# Patient Record
Sex: MALE | Employment: OTHER | URBAN - METROPOLITAN AREA
[De-identification: names, ages, dates, MRNs, and addresses within clinical notes are randomized per-mention and may not be internally consistent; named-entity substitution may affect disease eponyms.]

---

## 2020-01-01 ENCOUNTER — APPOINTMENT (OUTPATIENT)
Dept: GENERAL RADIOLOGY | Age: 80
DRG: 870 | End: 2020-01-01
Attending: FAMILY MEDICINE
Payer: MEDICARE

## 2020-01-01 ENCOUNTER — TELEPHONE (OUTPATIENT)
Dept: PULMONOLOGY | Age: 80
End: 2020-01-01

## 2020-01-01 ENCOUNTER — APPOINTMENT (OUTPATIENT)
Dept: CT IMAGING | Age: 80
DRG: 870 | End: 2020-01-01
Attending: FAMILY MEDICINE
Payer: MEDICARE

## 2020-01-01 ENCOUNTER — HOSPITAL ENCOUNTER (INPATIENT)
Age: 80
LOS: 5 days | DRG: 870 | End: 2020-06-11
Attending: FAMILY MEDICINE | Admitting: FAMILY MEDICINE
Payer: MEDICARE

## 2020-01-01 VITALS
SYSTOLIC BLOOD PRESSURE: 134 MMHG | BODY MASS INDEX: 29.72 KG/M2 | WEIGHT: 224.21 LBS | OXYGEN SATURATION: 83 % | TEMPERATURE: 98.8 F | DIASTOLIC BLOOD PRESSURE: 72 MMHG | HEIGHT: 73 IN

## 2020-01-01 LAB
A/G RATIO: 0.6 (ref 1.1–2.2)
A/G RATIO: 0.7 (ref 1.1–2.2)
A/G RATIO: 0.7 (ref 1.1–2.2)
A/G RATIO: 0.8 (ref 1.1–2.2)
ALBUMIN FLUID: 0.8 G/DL
ALBUMIN SERPL-MCNC: 1.4 G/DL (ref 3.4–5)
ALBUMIN SERPL-MCNC: 2.2 G/DL (ref 3.4–5)
ALBUMIN SERPL-MCNC: 2.3 G/DL (ref 3.4–5)
ALBUMIN SERPL-MCNC: 2.3 G/DL (ref 3.4–5)
ALBUMIN SERPL-MCNC: 2.4 G/DL (ref 3.4–5)
ALBUMIN SERPL-MCNC: 2.5 G/DL (ref 3.4–5)
ALP BLD-CCNC: 109 U/L (ref 40–129)
ALP BLD-CCNC: 111 U/L (ref 40–129)
ALP BLD-CCNC: 112 U/L (ref 40–129)
ALP BLD-CCNC: 142 U/L (ref 40–129)
ALP BLD-CCNC: 155 U/L (ref 40–129)
ALP BLD-CCNC: 66 U/L (ref 40–129)
ALT SERPL-CCNC: 15 U/L (ref 10–40)
ALT SERPL-CCNC: 29 U/L (ref 10–40)
ALT SERPL-CCNC: 39 U/L (ref 10–40)
ALT SERPL-CCNC: 48 U/L (ref 10–40)
ALT SERPL-CCNC: 56 U/L (ref 10–40)
ALT SERPL-CCNC: 57 U/L (ref 10–40)
ANION GAP SERPL CALCULATED.3IONS-SCNC: 14 MMOL/L (ref 3–16)
ANION GAP SERPL CALCULATED.3IONS-SCNC: 15 MMOL/L (ref 3–16)
ANION GAP SERPL CALCULATED.3IONS-SCNC: 18 MMOL/L (ref 3–16)
ANION GAP SERPL CALCULATED.3IONS-SCNC: 19 MMOL/L (ref 3–16)
ANION GAP SERPL CALCULATED.3IONS-SCNC: 19 MMOL/L (ref 3–16)
ANION GAP SERPL CALCULATED.3IONS-SCNC: 21 MMOL/L (ref 3–16)
ANION GAP SERPL CALCULATED.3IONS-SCNC: 21 MMOL/L (ref 3–16)
ANION GAP SERPL CALCULATED.3IONS-SCNC: 22 MMOL/L (ref 3–16)
ANION GAP SERPL CALCULATED.3IONS-SCNC: 22 MMOL/L (ref 3–16)
ANION GAP SERPL CALCULATED.3IONS-SCNC: 23 MMOL/L (ref 3–16)
ANION GAP SERPL CALCULATED.3IONS-SCNC: 8 MMOL/L (ref 3–16)
APPEARANCE FLUID: NORMAL
APTT: 41.2 SEC (ref 24.2–36.2)
AST SERPL-CCNC: 44 U/L (ref 15–37)
AST SERPL-CCNC: 68 U/L (ref 15–37)
AST SERPL-CCNC: 75 U/L (ref 15–37)
AST SERPL-CCNC: 90 U/L (ref 15–37)
AST SERPL-CCNC: 91 U/L (ref 15–37)
AST SERPL-CCNC: 95 U/L (ref 15–37)
BASE EXCESS ARTERIAL: -4.8 MMOL/L (ref -3–3)
BASE EXCESS ARTERIAL: -7.3 MMOL/L (ref -3–3)
BASE EXCESS ARTERIAL: 0.4 MMOL/L (ref -3–3)
BASE EXCESS ARTERIAL: 2.9 MMOL/L (ref -3–3)
BASE EXCESS ARTERIAL: 3.8 MMOL/L (ref -3–3)
BASE EXCESS ARTERIAL: 7.5 MMOL/L (ref -3–3)
BASE EXCESS ARTERIAL: 7.9 MMOL/L (ref -3–3)
BASOPHILS ABSOLUTE: 0.1 K/UL (ref 0–0.2)
BASOPHILS RELATIVE PERCENT: 0.4 %
BETA-HYDROXYBUTYRATE: 0.13 MMOL/L (ref 0–0.27)
BILIRUB SERPL-MCNC: 0.5 MG/DL (ref 0–1)
BILIRUB SERPL-MCNC: 0.6 MG/DL (ref 0–1)
BILIRUB SERPL-MCNC: 0.6 MG/DL (ref 0–1)
BILIRUB SERPL-MCNC: 0.7 MG/DL (ref 0–1)
BILIRUB SERPL-MCNC: 0.8 MG/DL (ref 0–1)
BILIRUB SERPL-MCNC: 0.8 MG/DL (ref 0–1)
BILIRUBIN URINE: NEGATIVE
BLOOD, URINE: ABNORMAL
BUN BLDV-MCNC: 31 MG/DL (ref 7–20)
BUN BLDV-MCNC: 40 MG/DL (ref 7–20)
BUN BLDV-MCNC: 40 MG/DL (ref 7–20)
BUN BLDV-MCNC: 41 MG/DL (ref 7–20)
BUN BLDV-MCNC: 43 MG/DL (ref 7–20)
BUN BLDV-MCNC: 44 MG/DL (ref 7–20)
BUN BLDV-MCNC: 48 MG/DL (ref 7–20)
BUN BLDV-MCNC: 50 MG/DL (ref 7–20)
BUN BLDV-MCNC: 50 MG/DL (ref 7–20)
BUN BLDV-MCNC: 53 MG/DL (ref 7–20)
BUN BLDV-MCNC: 60 MG/DL (ref 7–20)
CALCIUM IONIZED: 0.89 MMOL/L (ref 1.12–1.32)
CALCIUM IONIZED: 0.95 MMOL/L (ref 1.12–1.32)
CALCIUM IONIZED: 1.03 MMOL/L (ref 1.12–1.32)
CALCIUM IONIZED: 1.04 MMOL/L (ref 1.12–1.32)
CALCIUM IONIZED: 1.08 MMOL/L (ref 1.12–1.32)
CALCIUM IONIZED: 1.08 MMOL/L (ref 1.12–1.32)
CALCIUM IONIZED: 1.09 MMOL/L (ref 1.12–1.32)
CALCIUM IONIZED: 1.09 MMOL/L (ref 1.12–1.32)
CALCIUM IONIZED: 1.1 MMOL/L (ref 1.12–1.32)
CALCIUM IONIZED: 1.11 MMOL/L (ref 1.12–1.32)
CALCIUM IONIZED: 1.15 MMOL/L (ref 1.12–1.32)
CALCIUM SERPL-MCNC: 5.6 MG/DL (ref 8.3–10.6)
CALCIUM SERPL-MCNC: 6.7 MG/DL (ref 8.3–10.6)
CALCIUM SERPL-MCNC: 7.3 MG/DL (ref 8.3–10.6)
CALCIUM SERPL-MCNC: 7.6 MG/DL (ref 8.3–10.6)
CALCIUM SERPL-MCNC: 8 MG/DL (ref 8.3–10.6)
CALCIUM SERPL-MCNC: 8 MG/DL (ref 8.3–10.6)
CALCIUM SERPL-MCNC: 8.2 MG/DL (ref 8.3–10.6)
CALCIUM SERPL-MCNC: 8.2 MG/DL (ref 8.3–10.6)
CALCIUM SERPL-MCNC: 8.3 MG/DL (ref 8.3–10.6)
CALCIUM SERPL-MCNC: 8.4 MG/DL (ref 8.3–10.6)
CALCIUM SERPL-MCNC: 8.4 MG/DL (ref 8.3–10.6)
CARBOXYHEMOGLOBIN ARTERIAL: 1.1 % (ref 0–1.5)
CARBOXYHEMOGLOBIN ARTERIAL: 1.4 % (ref 0–1.5)
CARBOXYHEMOGLOBIN ARTERIAL: 1.5 % (ref 0–1.5)
CARBOXYHEMOGLOBIN ARTERIAL: 1.6 % (ref 0–1.5)
CARBOXYHEMOGLOBIN ARTERIAL: 1.6 % (ref 0–1.5)
CARBOXYHEMOGLOBIN ARTERIAL: 1.7 % (ref 0–1.5)
CARBOXYHEMOGLOBIN ARTERIAL: 2.1 % (ref 0–1.5)
CELL COUNT FLUID TYPE: NORMAL
CELLULAR CASTS 2, UA: ABNORMAL /LPF
CELLULAR CASTS: ABNORMAL /LPF
CHLORIDE BLD-SCNC: 100 MMOL/L (ref 99–110)
CHLORIDE BLD-SCNC: 115 MMOL/L (ref 99–110)
CHLORIDE BLD-SCNC: 89 MMOL/L (ref 99–110)
CHLORIDE BLD-SCNC: 90 MMOL/L (ref 99–110)
CHLORIDE BLD-SCNC: 91 MMOL/L (ref 99–110)
CHLORIDE BLD-SCNC: 91 MMOL/L (ref 99–110)
CHLORIDE BLD-SCNC: 92 MMOL/L (ref 99–110)
CHLORIDE BLD-SCNC: 92 MMOL/L (ref 99–110)
CHLORIDE BLD-SCNC: 93 MMOL/L (ref 99–110)
CHLORIDE BLD-SCNC: 95 MMOL/L (ref 99–110)
CHLORIDE BLD-SCNC: 98 MMOL/L (ref 99–110)
CHOLESTEROL FLUID: 15 MG/DL
CLARITY: ABNORMAL
CLOT EVALUATION: NORMAL
CO2: 19 MMOL/L (ref 21–32)
CO2: 20 MMOL/L (ref 21–32)
CO2: 21 MMOL/L (ref 21–32)
CO2: 21 MMOL/L (ref 21–32)
CO2: 22 MMOL/L (ref 21–32)
CO2: 24 MMOL/L (ref 21–32)
CO2: 25 MMOL/L (ref 21–32)
CO2: 26 MMOL/L (ref 21–32)
CO2: 28 MMOL/L (ref 21–32)
COARSE CASTS, UA: ABNORMAL /LPF (ref 0–2)
COLOR FLUID: YELLOW
COLOR: YELLOW
COMMENT UA: ABNORMAL
CREAT SERPL-MCNC: 1.2 MG/DL (ref 0.8–1.3)
CREAT SERPL-MCNC: 1.6 MG/DL (ref 0.8–1.3)
CREAT SERPL-MCNC: 1.7 MG/DL (ref 0.8–1.3)
CREAT SERPL-MCNC: 1.8 MG/DL (ref 0.8–1.3)
CREAT SERPL-MCNC: 1.9 MG/DL (ref 0.8–1.3)
CREAT SERPL-MCNC: 2 MG/DL (ref 0.8–1.3)
CREAT SERPL-MCNC: 2.1 MG/DL (ref 0.8–1.3)
CREAT SERPL-MCNC: 2.1 MG/DL (ref 0.8–1.3)
CREAT SERPL-MCNC: 2.3 MG/DL (ref 0.8–1.3)
CULTURE, RESPIRATORY: NORMAL
EOSINOPHILS ABSOLUTE: 0 K/UL (ref 0–0.6)
EOSINOPHILS RELATIVE PERCENT: 0.1 %
EPITHELIAL CELLS, UA: 3 /HPF (ref 0–5)
FINE CASTS, UA: ABNORMAL /LPF (ref 0–2)
FLUID TYPE: NORMAL
GFR AFRICAN AMERICAN: 33
GFR AFRICAN AMERICAN: 37
GFR AFRICAN AMERICAN: 37
GFR AFRICAN AMERICAN: 39
GFR AFRICAN AMERICAN: 41
GFR AFRICAN AMERICAN: 44
GFR AFRICAN AMERICAN: 47
GFR AFRICAN AMERICAN: 50
GFR AFRICAN AMERICAN: >60
GFR NON-AFRICAN AMERICAN: 27
GFR NON-AFRICAN AMERICAN: 30
GFR NON-AFRICAN AMERICAN: 30
GFR NON-AFRICAN AMERICAN: 32
GFR NON-AFRICAN AMERICAN: 34
GFR NON-AFRICAN AMERICAN: 36
GFR NON-AFRICAN AMERICAN: 39
GFR NON-AFRICAN AMERICAN: 42
GFR NON-AFRICAN AMERICAN: 58
GLOBULIN: 2.3 G/DL
GLOBULIN: 3 G/DL
GLOBULIN: 3.2 G/DL
GLOBULIN: 3.5 G/DL
GLOBULIN: 3.6 G/DL
GLOBULIN: 3.7 G/DL
GLUCOSE BLD-MCNC: 100 MG/DL (ref 70–99)
GLUCOSE BLD-MCNC: 105 MG/DL (ref 70–99)
GLUCOSE BLD-MCNC: 112 MG/DL (ref 70–99)
GLUCOSE BLD-MCNC: 115 MG/DL (ref 70–99)
GLUCOSE BLD-MCNC: 123 MG/DL (ref 70–99)
GLUCOSE BLD-MCNC: 137 MG/DL (ref 70–99)
GLUCOSE BLD-MCNC: 141 MG/DL (ref 70–99)
GLUCOSE BLD-MCNC: 152 MG/DL (ref 70–99)
GLUCOSE BLD-MCNC: 157 MG/DL (ref 70–99)
GLUCOSE BLD-MCNC: 158 MG/DL (ref 70–99)
GLUCOSE BLD-MCNC: 162 MG/DL (ref 70–99)
GLUCOSE BLD-MCNC: 166 MG/DL (ref 70–99)
GLUCOSE BLD-MCNC: 167 MG/DL (ref 70–99)
GLUCOSE BLD-MCNC: 168 MG/DL (ref 70–99)
GLUCOSE BLD-MCNC: 170 MG/DL (ref 70–99)
GLUCOSE BLD-MCNC: 171 MG/DL (ref 70–99)
GLUCOSE BLD-MCNC: 172 MG/DL (ref 70–99)
GLUCOSE BLD-MCNC: 173 MG/DL (ref 70–99)
GLUCOSE BLD-MCNC: 177 MG/DL (ref 70–99)
GLUCOSE BLD-MCNC: 177 MG/DL (ref 70–99)
GLUCOSE BLD-MCNC: 178 MG/DL (ref 70–99)
GLUCOSE BLD-MCNC: 182 MG/DL (ref 70–99)
GLUCOSE BLD-MCNC: 188 MG/DL (ref 70–99)
GLUCOSE BLD-MCNC: 193 MG/DL (ref 70–99)
GLUCOSE BLD-MCNC: 193 MG/DL (ref 70–99)
GLUCOSE BLD-MCNC: 200 MG/DL (ref 70–99)
GLUCOSE BLD-MCNC: 200 MG/DL (ref 70–99)
GLUCOSE BLD-MCNC: 207 MG/DL (ref 70–99)
GLUCOSE BLD-MCNC: 212 MG/DL (ref 70–99)
GLUCOSE BLD-MCNC: 213 MG/DL (ref 70–99)
GLUCOSE BLD-MCNC: 215 MG/DL (ref 70–99)
GLUCOSE BLD-MCNC: 219 MG/DL (ref 70–99)
GLUCOSE BLD-MCNC: 243 MG/DL (ref 70–99)
GLUCOSE BLD-MCNC: 309 MG/DL (ref 70–99)
GLUCOSE BLD-MCNC: 359 MG/DL (ref 70–99)
GLUCOSE BLD-MCNC: 377 MG/DL (ref 70–99)
GLUCOSE BLD-MCNC: 392 MG/DL (ref 70–99)
GLUCOSE BLD-MCNC: 440 MG/DL (ref 70–99)
GLUCOSE BLD-MCNC: 480 MG/DL (ref 70–99)
GLUCOSE BLD-MCNC: 482 MG/DL (ref 70–99)
GLUCOSE BLD-MCNC: 81 MG/DL (ref 70–99)
GLUCOSE BLD-MCNC: 93 MG/DL (ref 70–99)
GLUCOSE BLD-MCNC: 95 MG/DL (ref 70–99)
GLUCOSE URINE: 500 MG/DL
GLUCOSE, FLUID: 359 MG/DL
GRAM STAIN RESULT: NORMAL
HCO3 ARTERIAL: 19 MMOL/L (ref 21–29)
HCO3 ARTERIAL: 22.8 MMOL/L (ref 21–29)
HCO3 ARTERIAL: 24.6 MMOL/L (ref 21–29)
HCO3 ARTERIAL: 26 MMOL/L (ref 21–29)
HCO3 ARTERIAL: 27.1 MMOL/L (ref 21–29)
HCO3 ARTERIAL: 30.9 MMOL/L (ref 21–29)
HCO3 ARTERIAL: 31.7 MMOL/L (ref 21–29)
HCT VFR BLD CALC: 23.3 % (ref 40.5–52.5)
HCT VFR BLD CALC: 23.9 % (ref 40.5–52.5)
HCT VFR BLD CALC: 25.1 % (ref 40.5–52.5)
HEMOGLOBIN, ART, EXTENDED: 6.4 G/DL (ref 13.5–17.5)
HEMOGLOBIN, ART, EXTENDED: 6.5 G/DL (ref 13.5–17.5)
HEMOGLOBIN, ART, EXTENDED: 6.9 G/DL (ref 13.5–17.5)
HEMOGLOBIN, ART, EXTENDED: 6.9 G/DL (ref 13.5–17.5)
HEMOGLOBIN, ART, EXTENDED: 7 G/DL (ref 13.5–17.5)
HEMOGLOBIN, ART, EXTENDED: 7.3 G/DL (ref 13.5–17.5)
HEMOGLOBIN, ART, EXTENDED: 7.9 G/DL (ref 13.5–17.5)
HEMOGLOBIN: 7.2 G/DL (ref 13.5–17.5)
HEMOGLOBIN: 7.7 G/DL (ref 13.5–17.5)
HEMOGLOBIN: 8 G/DL (ref 13.5–17.5)
INR BLD: 1.81 (ref 0.86–1.14)
INR BLD: 2.16 (ref 0.86–1.14)
KETONES, URINE: NEGATIVE MG/DL
LACTATE DEHYDROGENASE: 484 U/L (ref 100–190)
LACTIC ACID: 1.1 MMOL/L (ref 0.4–2)
LACTIC ACID: 1.7 MMOL/L (ref 0.4–2)
LACTIC ACID: 1.8 MMOL/L (ref 0.4–2)
LACTIC ACID: 2.7 MMOL/L (ref 0.4–2)
LACTIC ACID: 4.5 MMOL/L (ref 0.4–2)
LACTIC ACID: 6.4 MMOL/L (ref 0.4–2)
LACTIC ACID: 6.5 MMOL/L (ref 0.4–2)
LACTIC ACID: 7.9 MMOL/L (ref 0.4–2)
LACTIC ACID: 9.3 MMOL/L (ref 0.4–2)
LD, FLUID: 250 U/L
LEUKOCYTE ESTERASE, URINE: NEGATIVE
LV EF: 58 %
LVEF MODALITY: NORMAL
LYMPHOCYTES ABSOLUTE: 0.6 K/UL (ref 1–5.1)
LYMPHOCYTES RELATIVE PERCENT: 3.1 %
LYMPHOCYTES, BODY FLUID: 27 %
MACROPHAGE FLUID: 17 %
MAGNESIUM: 1.3 MG/DL (ref 1.8–2.4)
MAGNESIUM: 1.5 MG/DL (ref 1.8–2.4)
MAGNESIUM: 1.7 MG/DL (ref 1.8–2.4)
MAGNESIUM: 1.7 MG/DL (ref 1.8–2.4)
MAGNESIUM: 1.8 MG/DL (ref 1.8–2.4)
MAGNESIUM: 1.9 MG/DL (ref 1.8–2.4)
MAGNESIUM: 1.9 MG/DL (ref 1.8–2.4)
MAGNESIUM: 2 MG/DL (ref 1.8–2.4)
MAGNESIUM: 2.1 MG/DL (ref 1.8–2.4)
MCH RBC QN AUTO: 27.6 PG (ref 26–34)
MCH RBC QN AUTO: 27.7 PG (ref 26–34)
MCH RBC QN AUTO: 28.6 PG (ref 26–34)
MCHC RBC AUTO-ENTMCNC: 30.5 G/DL (ref 31–36)
MCHC RBC AUTO-ENTMCNC: 31 G/DL (ref 31–36)
MCHC RBC AUTO-ENTMCNC: 33.5 G/DL (ref 31–36)
MCV RBC AUTO: 85.4 FL (ref 80–100)
MCV RBC AUTO: 89.3 FL (ref 80–100)
MCV RBC AUTO: 90.3 FL (ref 80–100)
MESOTHELIAL FLUID: 11 %
METHEMOGLOBIN ARTERIAL: 0.3 %
METHEMOGLOBIN ARTERIAL: 0.4 %
METHEMOGLOBIN ARTERIAL: 0.5 %
METHEMOGLOBIN ARTERIAL: 0.7 %
METHEMOGLOBIN ARTERIAL: 0.8 %
MICROSCOPIC EXAMINATION: YES
MONOCYTE, FLUID: 7 %
MONOCYTES ABSOLUTE: 1.5 K/UL (ref 0–1.3)
MONOCYTES RELATIVE PERCENT: 8 %
MRSA CULTURE ONLY: NORMAL
NEUTROPHIL, FLUID: 29 %
NEUTROPHILS ABSOLUTE: 17 K/UL (ref 1.7–7.7)
NEUTROPHILS RELATIVE PERCENT: 88.4 %
NITRITE, URINE: NEGATIVE
NUCLEATED CELLS FLUID: 598 /CUMM
NUMBER OF CELLS COUNTED FLUID: 100
O2 CONTENT ARTERIAL: 10 ML/DL
O2 CONTENT ARTERIAL: 11 ML/DL
O2 CONTENT ARTERIAL: 9 ML/DL
O2 CONTENT ARTERIAL: 9 ML/DL
O2 SAT, ARTERIAL: 100 %
O2 SAT, ARTERIAL: 100 %
O2 SAT, ARTERIAL: 97.7 %
O2 SAT, ARTERIAL: 98.7 %
O2 SAT, ARTERIAL: 99.2 %
O2 SAT, ARTERIAL: 99.6 %
O2 SAT, ARTERIAL: 99.9 %
O2 THERAPY: ABNORMAL
PCO2 ARTERIAL: 32.3 MMHG (ref 35–45)
PCO2 ARTERIAL: 34.5 MMHG (ref 35–45)
PCO2 ARTERIAL: 36.4 MMHG (ref 35–45)
PCO2 ARTERIAL: 37.5 MMHG (ref 35–45)
PCO2 ARTERIAL: 40.3 MMHG (ref 35–45)
PCO2 ARTERIAL: 41.2 MMHG (ref 35–45)
PCO2 ARTERIAL: 56 MMHG (ref 35–45)
PDW BLD-RTO: 19.8 % (ref 12.4–15.4)
PDW BLD-RTO: 20.6 % (ref 12.4–15.4)
PDW BLD-RTO: 20.7 % (ref 12.4–15.4)
PERFORMED ON: ABNORMAL
PERFORMED ON: NORMAL
PH ARTERIAL: 7.22 (ref 7.35–7.45)
PH ARTERIAL: 7.27 (ref 7.35–7.45)
PH ARTERIAL: 7.44 (ref 7.35–7.45)
PH ARTERIAL: 7.5 (ref 7.35–7.45)
PH ARTERIAL: 7.5 (ref 7.35–7.45)
PH ARTERIAL: 7.51 (ref 7.35–7.45)
PH ARTERIAL: 7.52 (ref 7.35–7.45)
PH UA: 5.5 (ref 5–8)
PH VENOUS: 7.19 (ref 7.35–7.45)
PH VENOUS: 7.25 (ref 7.35–7.45)
PH VENOUS: 7.34 (ref 7.35–7.45)
PH VENOUS: 7.34 (ref 7.35–7.45)
PH VENOUS: 7.41 (ref 7.35–7.45)
PH VENOUS: 7.43 (ref 7.35–7.45)
PH VENOUS: 7.49 (ref 7.35–7.45)
PH VENOUS: 7.49 (ref 7.35–7.45)
PH VENOUS: 7.51 (ref 7.35–7.45)
PH VENOUS: 7.51 (ref 7.35–7.45)
PH VENOUS: 7.53 (ref 7.35–7.45)
PH, BODY FLUID: 7.8
PHOSPHORUS: 4.4 MG/DL (ref 2.5–4.9)
PHOSPHORUS: 4.9 MG/DL (ref 2.5–4.9)
PHOSPHORUS: 5.2 MG/DL (ref 2.5–4.9)
PHOSPHORUS: 5.3 MG/DL (ref 2.5–4.9)
PHOSPHORUS: 5.5 MG/DL (ref 2.5–4.9)
PHOSPHORUS: 6.1 MG/DL (ref 2.5–4.9)
PHOSPHORUS: 6.7 MG/DL (ref 2.5–4.9)
PHOSPHORUS: 6.8 MG/DL (ref 2.5–4.9)
PLATELET # BLD: 193 K/UL (ref 135–450)
PLATELET # BLD: 284 K/UL (ref 135–450)
PLATELET # BLD: 355 K/UL (ref 135–450)
PMV BLD AUTO: 7.5 FL (ref 5–10.5)
PMV BLD AUTO: 7.7 FL (ref 5–10.5)
PMV BLD AUTO: 7.8 FL (ref 5–10.5)
PO2 ARTERIAL: 103 MMHG (ref 75–108)
PO2 ARTERIAL: 123 MMHG (ref 75–108)
PO2 ARTERIAL: 147 MMHG (ref 75–108)
PO2 ARTERIAL: 153 MMHG (ref 75–108)
PO2 ARTERIAL: 165 MMHG (ref 75–108)
PO2 ARTERIAL: 92.3 MMHG (ref 75–108)
PO2 ARTERIAL: 97.6 MMHG (ref 75–108)
POC POTASSIUM: 3.7 MMOL/L (ref 3.5–5.1)
POC POTASSIUM: 3.8 MMOL/L (ref 3.5–5.1)
POC POTASSIUM: 3.9 MMOL/L (ref 3.5–5.1)
POC POTASSIUM: 3.9 MMOL/L (ref 3.5–5.1)
POC POTASSIUM: 4 MMOL/L (ref 3.5–5.1)
POC POTASSIUM: 4.1 MMOL/L (ref 3.5–5.1)
POC POTASSIUM: 4.1 MMOL/L (ref 3.5–5.1)
POC SAMPLE TYPE: ABNORMAL
POC SAMPLE TYPE: NORMAL
POTASSIUM REFLEX MAGNESIUM: 2.5 MMOL/L (ref 3.5–5.1)
POTASSIUM REFLEX MAGNESIUM: 3.7 MMOL/L (ref 3.5–5.1)
POTASSIUM REFLEX MAGNESIUM: 3.9 MMOL/L (ref 3.5–5.1)
POTASSIUM REFLEX MAGNESIUM: 3.9 MMOL/L (ref 3.5–5.1)
POTASSIUM REFLEX MAGNESIUM: 4.1 MMOL/L (ref 3.5–5.1)
POTASSIUM REFLEX MAGNESIUM: 4.5 MMOL/L (ref 3.5–5.1)
POTASSIUM SERPL-SCNC: 2.5 MMOL/L (ref 3.5–5.1)
POTASSIUM SERPL-SCNC: 3.1 MMOL/L (ref 3.5–5.1)
POTASSIUM SERPL-SCNC: 3.3 MMOL/L (ref 3.5–5.1)
POTASSIUM SERPL-SCNC: 3.7 MMOL/L (ref 3.5–5.1)
POTASSIUM SERPL-SCNC: 3.9 MMOL/L (ref 3.5–5.1)
POTASSIUM SERPL-SCNC: 3.9 MMOL/L (ref 3.5–5.1)
POTASSIUM SERPL-SCNC: 4.2 MMOL/L (ref 3.5–5.1)
POTASSIUM SERPL-SCNC: 4.4 MMOL/L (ref 3.5–5.1)
POTASSIUM SERPL-SCNC: 4.4 MMOL/L (ref 3.5–5.1)
POTASSIUM SERPL-SCNC: 4.5 MMOL/L (ref 3.5–5.1)
PROCALCITONIN: 0.52 NG/ML (ref 0–0.15)
PROCALCITONIN: 1.49 NG/ML (ref 0–0.15)
PROCALCITONIN: 3.39 NG/ML (ref 0–0.15)
PROCALCITONIN: 3.6 NG/ML (ref 0–0.15)
PROCALCITONIN: 4.27 NG/ML (ref 0–0.15)
PROCALCITONIN: 5.46 NG/ML (ref 0–0.15)
PROTEIN FLUID: 1.7 G/DL
PROTEIN UA: 30 MG/DL
PROTHROMBIN TIME: 21.1 SEC (ref 10–13.2)
PROTHROMBIN TIME: 25.2 SEC (ref 10–13.2)
RBC # BLD: 2.6 M/UL (ref 4.2–5.9)
RBC # BLD: 2.78 M/UL (ref 4.2–5.9)
RBC # BLD: 2.8 M/UL (ref 4.2–5.9)
RBC FLUID: 2771 /CUMM
RBC UA: 11 /HPF (ref 0–4)
SARS-COV-2: NOT DETECTED
SODIUM BLD-SCNC: 132 MMOL/L (ref 136–145)
SODIUM BLD-SCNC: 133 MMOL/L (ref 136–145)
SODIUM BLD-SCNC: 133 MMOL/L (ref 136–145)
SODIUM BLD-SCNC: 134 MMOL/L (ref 136–145)
SODIUM BLD-SCNC: 135 MMOL/L (ref 136–145)
SODIUM BLD-SCNC: 137 MMOL/L (ref 136–145)
SODIUM BLD-SCNC: 137 MMOL/L (ref 136–145)
SODIUM BLD-SCNC: 138 MMOL/L (ref 136–145)
SODIUM BLD-SCNC: 143 MMOL/L (ref 136–145)
SOURCE: NORMAL
SPECIFIC GRAVITY UA: 1.01 (ref 1–1.03)
TCO2 ARTERIAL: 20.2 MMOL/L
TCO2 ARTERIAL: 24.5 MMOL/L
TCO2 ARTERIAL: 25.7 MMOL/L
TCO2 ARTERIAL: 27 MMOL/L
TCO2 ARTERIAL: 28.2 MMOL/L
TCO2 ARTERIAL: 32 MMOL/L
TCO2 ARTERIAL: 33 MMOL/L
TOTAL PROTEIN: 3.7 G/DL (ref 6.4–8.2)
TOTAL PROTEIN: 5.3 G/DL (ref 6.4–8.2)
TOTAL PROTEIN: 5.5 G/DL (ref 6.4–8.2)
TOTAL PROTEIN: 5.7 G/DL (ref 6.4–8.2)
TOTAL PROTEIN: 6.1 G/DL (ref 6.4–8.2)
TOTAL PROTEIN: 6.1 G/DL (ref 6.4–8.2)
TROPONIN: 0.09 NG/ML
TROPONIN: 0.11 NG/ML
TROPONIN: 0.13 NG/ML
TROPONIN: 0.14 NG/ML
TROPONIN: 0.17 NG/ML
TROPONIN: 0.22 NG/ML
URINE TYPE: ABNORMAL
UROBILINOGEN, URINE: 0.2 E.U./DL
VANCOMYCIN RANDOM: 19.1 UG/ML
VANCOMYCIN RANDOM: 20.1 UG/ML
VANCOMYCIN RANDOM: 22.8 UG/ML
VANCOMYCIN RANDOM: 24.3 UG/ML
VARIANT LYMPH FLUID: 9 %
VOLUME: 40 ML
WBC # BLD: 15.7 K/UL (ref 4–11)
WBC # BLD: 19.2 K/UL (ref 4–11)
WBC # BLD: 8.7 K/UL (ref 4–11)
WBC UA: 93 /HPF (ref 0–5)

## 2020-01-01 PROCEDURE — 6360000002 HC RX W HCPCS: Performed by: FAMILY MEDICINE

## 2020-01-01 PROCEDURE — 83735 ASSAY OF MAGNESIUM: CPT

## 2020-01-01 PROCEDURE — B246ZZZ ULTRASONOGRAPHY OF RIGHT AND LEFT HEART: ICD-10-PCS | Performed by: INTERNAL MEDICINE

## 2020-01-01 PROCEDURE — 84145 PROCALCITONIN (PCT): CPT

## 2020-01-01 PROCEDURE — 94003 VENT MGMT INPAT SUBQ DAY: CPT

## 2020-01-01 PROCEDURE — 82330 ASSAY OF CALCIUM: CPT

## 2020-01-01 PROCEDURE — 2580000003 HC RX 258: Performed by: INTERNAL MEDICINE

## 2020-01-01 PROCEDURE — APPNB15 APP NON BILLABLE TIME 0-15 MINS: Performed by: NURSE PRACTITIONER

## 2020-01-01 PROCEDURE — 2500000003 HC RX 250 WO HCPCS: Performed by: FAMILY MEDICINE

## 2020-01-01 PROCEDURE — 6360000002 HC RX W HCPCS: Performed by: NURSE PRACTITIONER

## 2020-01-01 PROCEDURE — 2000000000 HC ICU R&B

## 2020-01-01 PROCEDURE — 2580000003 HC RX 258: Performed by: HOSPITALIST

## 2020-01-01 PROCEDURE — 71045 X-RAY EXAM CHEST 1 VIEW: CPT

## 2020-01-01 PROCEDURE — 99291 CRITICAL CARE FIRST HOUR: CPT | Performed by: INTERNAL MEDICINE

## 2020-01-01 PROCEDURE — 2700000000 HC OXYGEN THERAPY PER DAY

## 2020-01-01 PROCEDURE — 82947 ASSAY GLUCOSE BLOOD QUANT: CPT

## 2020-01-01 PROCEDURE — 94750 HC PULMONARY COMPLIANCE STUDY: CPT

## 2020-01-01 PROCEDURE — 37799 UNLISTED PX VASCULAR SURGERY: CPT

## 2020-01-01 PROCEDURE — 6370000000 HC RX 637 (ALT 250 FOR IP): Performed by: FAMILY MEDICINE

## 2020-01-01 PROCEDURE — 6370000000 HC RX 637 (ALT 250 FOR IP): Performed by: NURSE PRACTITIONER

## 2020-01-01 PROCEDURE — 36592 COLLECT BLOOD FROM PICC: CPT

## 2020-01-01 PROCEDURE — 84100 ASSAY OF PHOSPHORUS: CPT

## 2020-01-01 PROCEDURE — 84484 ASSAY OF TROPONIN QUANT: CPT

## 2020-01-01 PROCEDURE — 85027 COMPLETE CBC AUTOMATED: CPT

## 2020-01-01 PROCEDURE — 87205 SMEAR GRAM STAIN: CPT

## 2020-01-01 PROCEDURE — 88305 TISSUE EXAM BY PATHOLOGIST: CPT

## 2020-01-01 PROCEDURE — 82010 KETONE BODYS QUAN: CPT

## 2020-01-01 PROCEDURE — 82803 BLOOD GASES ANY COMBINATION: CPT

## 2020-01-01 PROCEDURE — 2580000003 HC RX 258: Performed by: FAMILY MEDICINE

## 2020-01-01 PROCEDURE — 84157 ASSAY OF PROTEIN OTHER: CPT

## 2020-01-01 PROCEDURE — 80202 ASSAY OF VANCOMYCIN: CPT

## 2020-01-01 PROCEDURE — 4A133BC MONITORING OF ARTERIAL PRESSURE, CORONARY, PERCUTANEOUS APPROACH: ICD-10-PCS | Performed by: INTERNAL MEDICINE

## 2020-01-01 PROCEDURE — 36620 INSERTION CATHETER ARTERY: CPT | Performed by: INTERNAL MEDICINE

## 2020-01-01 PROCEDURE — 70450 CT HEAD/BRAIN W/O DYE: CPT

## 2020-01-01 PROCEDURE — 80053 COMPREHEN METABOLIC PANEL: CPT

## 2020-01-01 PROCEDURE — 0W9B30Z DRAINAGE OF LEFT PLEURAL CAVITY WITH DRAINAGE DEVICE, PERCUTANEOUS APPROACH: ICD-10-PCS | Performed by: INTERNAL MEDICINE

## 2020-01-01 PROCEDURE — 85730 THROMBOPLASTIN TIME PARTIAL: CPT

## 2020-01-01 PROCEDURE — 84132 ASSAY OF SERUM POTASSIUM: CPT

## 2020-01-01 PROCEDURE — 82465 ASSAY BLD/SERUM CHOLESTEROL: CPT

## 2020-01-01 PROCEDURE — C9113 INJ PANTOPRAZOLE SODIUM, VIA: HCPCS | Performed by: INTERNAL MEDICINE

## 2020-01-01 PROCEDURE — 83605 ASSAY OF LACTIC ACID: CPT

## 2020-01-01 PROCEDURE — 6360000002 HC RX W HCPCS: Performed by: INTERNAL MEDICINE

## 2020-01-01 PROCEDURE — 6370000000 HC RX 637 (ALT 250 FOR IP): Performed by: INTERNAL MEDICINE

## 2020-01-01 PROCEDURE — 94761 N-INVAS EAR/PLS OXIMETRY MLT: CPT

## 2020-01-01 PROCEDURE — 87081 CULTURE SCREEN ONLY: CPT

## 2020-01-01 PROCEDURE — 36415 COLL VENOUS BLD VENIPUNCTURE: CPT

## 2020-01-01 PROCEDURE — 36600 WITHDRAWAL OF ARTERIAL BLOOD: CPT

## 2020-01-01 PROCEDURE — 83615 LACTATE (LD) (LDH) ENZYME: CPT

## 2020-01-01 PROCEDURE — 94002 VENT MGMT INPAT INIT DAY: CPT

## 2020-01-01 PROCEDURE — 85025 COMPLETE CBC W/AUTO DIFF WBC: CPT

## 2020-01-01 PROCEDURE — 5A1955Z RESPIRATORY VENTILATION, GREATER THAN 96 CONSECUTIVE HOURS: ICD-10-PCS | Performed by: FAMILY MEDICINE

## 2020-01-01 PROCEDURE — 85610 PROTHROMBIN TIME: CPT

## 2020-01-01 PROCEDURE — 74176 CT ABD & PELVIS W/O CONTRAST: CPT

## 2020-01-01 PROCEDURE — C8929 TTE W OR WO FOL WCON,DOPPLER: HCPCS

## 2020-01-01 PROCEDURE — 2500000003 HC RX 250 WO HCPCS: Performed by: INTERNAL MEDICINE

## 2020-01-01 PROCEDURE — U0003 INFECTIOUS AGENT DETECTION BY NUCLEIC ACID (DNA OR RNA); SEVERE ACUTE RESPIRATORY SYNDROME CORONAVIRUS 2 (SARS-COV-2) (CORONAVIRUS DISEASE [COVID-19]), AMPLIFIED PROBE TECHNIQUE, MAKING USE OF HIGH THROUGHPUT TECHNOLOGIES AS DESCRIBED BY CMS-2020-01-R: HCPCS

## 2020-01-01 PROCEDURE — 82042 OTHER SOURCE ALBUMIN QUAN EA: CPT

## 2020-01-01 PROCEDURE — 83986 ASSAY PH BODY FLUID NOS: CPT

## 2020-01-01 PROCEDURE — 6360000004 HC RX CONTRAST MEDICATION: Performed by: FAMILY MEDICINE

## 2020-01-01 PROCEDURE — 2580000003 HC RX 258: Performed by: NURSE PRACTITIONER

## 2020-01-01 PROCEDURE — 76937 US GUIDE VASCULAR ACCESS: CPT | Performed by: INTERNAL MEDICINE

## 2020-01-01 PROCEDURE — 80048 BASIC METABOLIC PNL TOTAL CA: CPT

## 2020-01-01 PROCEDURE — 94760 N-INVAS EAR/PLS OXIMETRY 1: CPT

## 2020-01-01 PROCEDURE — 32557 INSERT CATH PLEURA W/ IMAGE: CPT | Performed by: INTERNAL MEDICINE

## 2020-01-01 PROCEDURE — 99292 CRITICAL CARE ADDL 30 MIN: CPT | Performed by: INTERNAL MEDICINE

## 2020-01-01 PROCEDURE — 02HV33Z INSERTION OF INFUSION DEVICE INTO SUPERIOR VENA CAVA, PERCUTANEOUS APPROACH: ICD-10-PCS | Performed by: INTERNAL MEDICINE

## 2020-01-01 PROCEDURE — 36556 INSERT NON-TUNNEL CV CATH: CPT | Performed by: INTERNAL MEDICINE

## 2020-01-01 PROCEDURE — 82945 GLUCOSE OTHER FLUID: CPT

## 2020-01-01 PROCEDURE — 95819 EEG AWAKE AND ASLEEP: CPT

## 2020-01-01 PROCEDURE — 87070 CULTURE OTHR SPECIMN AEROBIC: CPT

## 2020-01-01 PROCEDURE — 89051 BODY FLUID CELL COUNT: CPT

## 2020-01-01 PROCEDURE — 88112 CYTOPATH CELL ENHANCE TECH: CPT

## 2020-01-01 PROCEDURE — 81001 URINALYSIS AUTO W/SCOPE: CPT

## 2020-01-01 RX ORDER — MAGNESIUM OXIDE 400 MG/1
400 TABLET ORAL DAILY
COMMUNITY

## 2020-01-01 RX ORDER — TROLAMINE SALICYLATE 10 G/100G
1 CREAM TOPICAL 2 TIMES DAILY
COMMUNITY

## 2020-01-01 RX ORDER — ERGOCALCIFEROL (VITAMIN D2) 1250 MCG
50000 CAPSULE ORAL WEEKLY
COMMUNITY

## 2020-01-01 RX ORDER — CALCIUM GLUCONATE 20 MG/ML
1 INJECTION, SOLUTION INTRAVENOUS ONCE
Status: COMPLETED | OUTPATIENT
Start: 2020-01-01 | End: 2020-01-01

## 2020-01-01 RX ORDER — MORPHINE SULFATE 4 MG/ML
4 INJECTION, SOLUTION INTRAMUSCULAR; INTRAVENOUS
Status: DISCONTINUED | OUTPATIENT
Start: 2020-01-01 | End: 2020-01-01 | Stop reason: HOSPADM

## 2020-01-01 RX ORDER — NICOTINE POLACRILEX 4 MG
15 LOZENGE BUCCAL PRN
Status: DISCONTINUED | OUTPATIENT
Start: 2020-01-01 | End: 2020-01-01 | Stop reason: HOSPADM

## 2020-01-01 RX ORDER — MAGNESIUM SULFATE 1 G/100ML
1 INJECTION INTRAVENOUS PRN
Status: DISCONTINUED | OUTPATIENT
Start: 2020-01-01 | End: 2020-01-01

## 2020-01-01 RX ORDER — PANTOPRAZOLE SODIUM 40 MG/1
40 TABLET, DELAYED RELEASE ORAL DAILY
COMMUNITY

## 2020-01-01 RX ORDER — INSULIN GLARGINE 100 [IU]/ML
20 INJECTION, SOLUTION SUBCUTANEOUS NIGHTLY
COMMUNITY

## 2020-01-01 RX ORDER — CEFTRIAXONE 2 G/1
2 INJECTION, POWDER, FOR SOLUTION INTRAMUSCULAR; INTRAVENOUS EVERY 24 HOURS
COMMUNITY
Start: 2020-01-01 | End: 2020-06-30

## 2020-01-01 RX ORDER — CHLORHEXIDINE GLUCONATE 0.12 MG/ML
15 RINSE ORAL 2 TIMES DAILY
Status: DISCONTINUED | OUTPATIENT
Start: 2020-01-01 | End: 2020-01-01 | Stop reason: HOSPADM

## 2020-01-01 RX ORDER — PANTOPRAZOLE SODIUM 40 MG/10ML
40 INJECTION, POWDER, LYOPHILIZED, FOR SOLUTION INTRAVENOUS DAILY
Status: DISCONTINUED | OUTPATIENT
Start: 2020-01-01 | End: 2020-01-01 | Stop reason: HOSPADM

## 2020-01-01 RX ORDER — LORAZEPAM 2 MG/ML
2 INJECTION INTRAMUSCULAR
Status: DISCONTINUED | OUTPATIENT
Start: 2020-01-01 | End: 2020-01-01 | Stop reason: HOSPADM

## 2020-01-01 RX ORDER — METOPROLOL TARTRATE 50 MG/1
50 TABLET, FILM COATED ORAL EVERY MORNING
COMMUNITY

## 2020-01-01 RX ORDER — FLUTICASONE FUROATE, UMECLIDINIUM BROMIDE AND VILANTEROL TRIFENATATE 100; 62.5; 25 UG/1; UG/1; UG/1
1 POWDER RESPIRATORY (INHALATION) DAILY
COMMUNITY

## 2020-01-01 RX ORDER — SODIUM CHLORIDE, SODIUM LACTATE, POTASSIUM CHLORIDE, CALCIUM CHLORIDE 600; 310; 30; 20 MG/100ML; MG/100ML; MG/100ML; MG/100ML
INJECTION, SOLUTION INTRAVENOUS CONTINUOUS
Status: DISCONTINUED | OUTPATIENT
Start: 2020-01-01 | End: 2020-01-01 | Stop reason: HOSPADM

## 2020-01-01 RX ORDER — SODIUM CHLORIDE 0.9 % (FLUSH) 0.9 %
10 SYRINGE (ML) INJECTION PRN
Status: DISCONTINUED | OUTPATIENT
Start: 2020-01-01 | End: 2020-01-01 | Stop reason: HOSPADM

## 2020-01-01 RX ORDER — MORPHINE SULFATE 4 MG/ML
4 INJECTION, SOLUTION INTRAMUSCULAR; INTRAVENOUS ONCE
Status: COMPLETED | OUTPATIENT
Start: 2020-01-01 | End: 2020-01-01

## 2020-01-01 RX ORDER — 0.9 % SODIUM CHLORIDE 0.9 %
2000 INTRAVENOUS SOLUTION INTRAVENOUS ONCE
Status: COMPLETED | OUTPATIENT
Start: 2020-01-01 | End: 2020-01-01

## 2020-01-01 RX ORDER — MAGNESIUM SULFATE IN WATER 40 MG/ML
2 INJECTION, SOLUTION INTRAVENOUS ONCE
Status: DISCONTINUED | OUTPATIENT
Start: 2020-01-01 | End: 2020-01-01

## 2020-01-01 RX ORDER — POTASSIUM CHLORIDE 29.8 MG/ML
20 INJECTION INTRAVENOUS
Status: COMPLETED | OUTPATIENT
Start: 2020-01-01 | End: 2020-01-01

## 2020-01-01 RX ORDER — MORPHINE SULFATE 2 MG/ML
2 INJECTION, SOLUTION INTRAMUSCULAR; INTRAVENOUS
Status: DISCONTINUED | OUTPATIENT
Start: 2020-01-01 | End: 2020-01-01 | Stop reason: HOSPADM

## 2020-01-01 RX ORDER — DEXTROSE MONOHYDRATE 25 G/50ML
12.5 INJECTION, SOLUTION INTRAVENOUS PRN
Status: DISCONTINUED | OUTPATIENT
Start: 2020-01-01 | End: 2020-01-01 | Stop reason: HOSPADM

## 2020-01-01 RX ORDER — AMLODIPINE BESYLATE 5 MG/1
5 TABLET ORAL DAILY
COMMUNITY

## 2020-01-01 RX ORDER — ONDANSETRON 2 MG/ML
4 INJECTION INTRAMUSCULAR; INTRAVENOUS EVERY 6 HOURS PRN
Status: DISCONTINUED | OUTPATIENT
Start: 2020-01-01 | End: 2020-01-01 | Stop reason: HOSPADM

## 2020-01-01 RX ORDER — DONEPEZIL HYDROCHLORIDE 10 MG/1
10 TABLET, FILM COATED ORAL NIGHTLY
COMMUNITY

## 2020-01-01 RX ORDER — ACETAMINOPHEN 650 MG/1
650 SUPPOSITORY RECTAL EVERY 6 HOURS PRN
Status: DISCONTINUED | OUTPATIENT
Start: 2020-01-01 | End: 2020-01-01 | Stop reason: HOSPADM

## 2020-01-01 RX ORDER — LEVETIRACETAM 5 MG/ML
500 INJECTION INTRAVASCULAR EVERY 12 HOURS
Status: DISCONTINUED | OUTPATIENT
Start: 2020-01-01 | End: 2020-01-01 | Stop reason: HOSPADM

## 2020-01-01 RX ORDER — ACETAMINOPHEN 325 MG/1
650 TABLET ORAL EVERY 6 HOURS PRN
COMMUNITY

## 2020-01-01 RX ORDER — PYRIDOXINE HCL (VITAMIN B6) 100 MG
100 TABLET ORAL DAILY
COMMUNITY

## 2020-01-01 RX ORDER — POTASSIUM CHLORIDE 29.8 MG/ML
40 INJECTION INTRAVENOUS ONCE
Status: DISCONTINUED | OUTPATIENT
Start: 2020-01-01 | End: 2020-01-01

## 2020-01-01 RX ORDER — IPRATROPIUM BROMIDE 21 UG/1
2 SPRAY, METERED NASAL DAILY
COMMUNITY

## 2020-01-01 RX ORDER — SODIUM CHLORIDE 0.9 % (FLUSH) 0.9 %
10 SYRINGE (ML) INJECTION EVERY 12 HOURS SCHEDULED
Status: DISCONTINUED | OUTPATIENT
Start: 2020-01-01 | End: 2020-01-01 | Stop reason: HOSPADM

## 2020-01-01 RX ORDER — HEPARIN SODIUM 5000 [USP'U]/ML
5000 INJECTION, SOLUTION INTRAVENOUS; SUBCUTANEOUS EVERY 8 HOURS SCHEDULED
Status: DISCONTINUED | OUTPATIENT
Start: 2020-01-01 | End: 2020-01-01 | Stop reason: HOSPADM

## 2020-01-01 RX ORDER — ATORVASTATIN CALCIUM 80 MG/1
80 TABLET, FILM COATED ORAL DAILY
COMMUNITY

## 2020-01-01 RX ORDER — PROMETHAZINE HYDROCHLORIDE 25 MG/1
12.5 TABLET ORAL EVERY 6 HOURS PRN
Status: DISCONTINUED | OUTPATIENT
Start: 2020-01-01 | End: 2020-01-01 | Stop reason: HOSPADM

## 2020-01-01 RX ORDER — PROMETHAZINE HYDROCHLORIDE 25 MG/1
12.5 TABLET ORAL EVERY 6 HOURS PRN
Status: DISCONTINUED | OUTPATIENT
Start: 2020-01-01 | End: 2020-01-01

## 2020-01-01 RX ORDER — DEXTROSE MONOHYDRATE 50 MG/ML
100 INJECTION, SOLUTION INTRAVENOUS PRN
Status: DISCONTINUED | OUTPATIENT
Start: 2020-01-01 | End: 2020-01-01 | Stop reason: HOSPADM

## 2020-01-01 RX ORDER — ASPIRIN 81 MG/1
81 TABLET ORAL DAILY
COMMUNITY

## 2020-01-01 RX ORDER — ACETAMINOPHEN 325 MG/1
650 TABLET ORAL EVERY 6 HOURS PRN
Status: DISCONTINUED | OUTPATIENT
Start: 2020-01-01 | End: 2020-01-01 | Stop reason: HOSPADM

## 2020-01-01 RX ORDER — MAGNESIUM SULFATE 1 G/100ML
1 INJECTION INTRAVENOUS ONCE
Status: COMPLETED | OUTPATIENT
Start: 2020-01-01 | End: 2020-01-01

## 2020-01-01 RX ORDER — LORAZEPAM 2 MG/ML
4 INJECTION INTRAMUSCULAR ONCE
Status: COMPLETED | OUTPATIENT
Start: 2020-01-01 | End: 2020-01-01

## 2020-01-01 RX ORDER — FUROSEMIDE 40 MG/1
40 TABLET ORAL 2 TIMES DAILY
COMMUNITY

## 2020-01-01 RX ORDER — ONDANSETRON 2 MG/ML
4 INJECTION INTRAMUSCULAR; INTRAVENOUS EVERY 6 HOURS PRN
Status: DISCONTINUED | OUTPATIENT
Start: 2020-01-01 | End: 2020-01-01 | Stop reason: SDUPTHER

## 2020-01-01 RX ORDER — POLYETHYLENE GLYCOL 3350 17 G/17G
17 POWDER, FOR SOLUTION ORAL DAILY PRN
Status: DISCONTINUED | OUTPATIENT
Start: 2020-01-01 | End: 2020-01-01 | Stop reason: HOSPADM

## 2020-01-01 RX ADMIN — CHLORHEXIDINE GLUCONATE 15 ML: 1.2 RINSE ORAL at 08:35

## 2020-01-01 RX ADMIN — MUPIROCIN: 20 OINTMENT TOPICAL at 21:41

## 2020-01-01 RX ADMIN — INSULIN LISPRO 3 UNITS: 100 INJECTION, SOLUTION INTRAVENOUS; SUBCUTANEOUS at 15:31

## 2020-01-01 RX ADMIN — INSULIN LISPRO 3 UNITS: 100 INJECTION, SOLUTION INTRAVENOUS; SUBCUTANEOUS at 08:39

## 2020-01-01 RX ADMIN — HEPARIN SODIUM 5000 UNITS: 5000 INJECTION INTRAVENOUS; SUBCUTANEOUS at 04:48

## 2020-01-01 RX ADMIN — MORPHINE SULFATE 4 MG: 4 INJECTION, SOLUTION INTRAMUSCULAR; INTRAVENOUS at 09:00

## 2020-01-01 RX ADMIN — LEVETIRACETAM 500 MG: 5 INJECTION INTRAVENOUS at 09:37

## 2020-01-01 RX ADMIN — SODIUM CHLORIDE, POTASSIUM CHLORIDE, SODIUM LACTATE AND CALCIUM CHLORIDE: 600; 310; 30; 20 INJECTION, SOLUTION INTRAVENOUS at 14:09

## 2020-01-01 RX ADMIN — CHLORHEXIDINE GLUCONATE 15 ML: 1.2 RINSE ORAL at 08:00

## 2020-01-01 RX ADMIN — SODIUM CHLORIDE, PRESERVATIVE FREE 10 ML: 5 INJECTION INTRAVENOUS at 08:35

## 2020-01-01 RX ADMIN — CALCIUM GLUCONATE 1 G: 20 INJECTION, SOLUTION INTRAVENOUS at 14:00

## 2020-01-01 RX ADMIN — INSULIN LISPRO 6 UNITS: 100 INJECTION, SOLUTION INTRAVENOUS; SUBCUTANEOUS at 14:04

## 2020-01-01 RX ADMIN — SODIUM CHLORIDE, POTASSIUM CHLORIDE, SODIUM LACTATE AND CALCIUM CHLORIDE: 600; 310; 30; 20 INJECTION, SOLUTION INTRAVENOUS at 05:22

## 2020-01-01 RX ADMIN — SODIUM CHLORIDE, PRESERVATIVE FREE 10 ML: 5 INJECTION INTRAVENOUS at 07:59

## 2020-01-01 RX ADMIN — CALCIUM GLUCONATE 2 G: 98 INJECTION, SOLUTION INTRAVENOUS at 08:42

## 2020-01-01 RX ADMIN — CHLORHEXIDINE GLUCONATE 15 ML: 1.2 RINSE ORAL at 21:15

## 2020-01-01 RX ADMIN — INSULIN LISPRO 2 UNITS: 100 INJECTION, SOLUTION INTRAVENOUS; SUBCUTANEOUS at 14:31

## 2020-01-01 RX ADMIN — MUPIROCIN: 20 OINTMENT TOPICAL at 21:00

## 2020-01-01 RX ADMIN — MEROPENEM 500 MG: 500 INJECTION, POWDER, FOR SOLUTION INTRAVENOUS at 18:41

## 2020-01-01 RX ADMIN — INSULIN LISPRO 3 UNITS: 100 INJECTION, SOLUTION INTRAVENOUS; SUBCUTANEOUS at 21:30

## 2020-01-01 RX ADMIN — VANCOMYCIN HYDROCHLORIDE 1500 MG: 10 INJECTION, POWDER, LYOPHILIZED, FOR SOLUTION INTRAVENOUS at 20:58

## 2020-01-01 RX ADMIN — HEPARIN SODIUM 5000 UNITS: 5000 INJECTION INTRAVENOUS; SUBCUTANEOUS at 05:46

## 2020-01-01 RX ADMIN — HEPARIN SODIUM 5000 UNITS: 5000 INJECTION INTRAVENOUS; SUBCUTANEOUS at 15:29

## 2020-01-01 RX ADMIN — SODIUM CHLORIDE, POTASSIUM CHLORIDE, SODIUM LACTATE AND CALCIUM CHLORIDE: 600; 310; 30; 20 INJECTION, SOLUTION INTRAVENOUS at 01:59

## 2020-01-01 RX ADMIN — SODIUM CHLORIDE, PRESERVATIVE FREE 10 ML: 5 INJECTION INTRAVENOUS at 21:00

## 2020-01-01 RX ADMIN — SODIUM CHLORIDE, PRESERVATIVE FREE 10 ML: 5 INJECTION INTRAVENOUS at 22:19

## 2020-01-01 RX ADMIN — INSULIN LISPRO 2 UNITS: 100 INJECTION, SOLUTION INTRAVENOUS; SUBCUTANEOUS at 10:03

## 2020-01-01 RX ADMIN — MEROPENEM 500 MG: 500 INJECTION, POWDER, FOR SOLUTION INTRAVENOUS at 13:10

## 2020-01-01 RX ADMIN — MUPIROCIN: 20 OINTMENT TOPICAL at 22:19

## 2020-01-01 RX ADMIN — INSULIN LISPRO 4 UNITS: 100 INJECTION, SOLUTION INTRAVENOUS; SUBCUTANEOUS at 04:48

## 2020-01-01 RX ADMIN — CALCIUM GLUCONATE 2 G: 98 INJECTION, SOLUTION INTRAVENOUS at 10:05

## 2020-01-01 RX ADMIN — PANTOPRAZOLE SODIUM 40 MG: 40 INJECTION, POWDER, FOR SOLUTION INTRAVENOUS at 09:03

## 2020-01-01 RX ADMIN — LORAZEPAM 4 MG: 2 INJECTION INTRAMUSCULAR; INTRAVENOUS at 09:00

## 2020-01-01 RX ADMIN — SODIUM CHLORIDE 10.98 UNITS/HR: 9 INJECTION, SOLUTION INTRAVENOUS at 09:47

## 2020-01-01 RX ADMIN — MEROPENEM 500 MG: 500 INJECTION, POWDER, FOR SOLUTION INTRAVENOUS at 00:18

## 2020-01-01 RX ADMIN — ALTEPLASE 1 MG: 2.2 INJECTION, POWDER, LYOPHILIZED, FOR SOLUTION INTRAVENOUS at 11:30

## 2020-01-01 RX ADMIN — PIPERACILLIN AND TAZOBACTAM 3.38 G: 3; .375 INJECTION, POWDER, LYOPHILIZED, FOR SOLUTION INTRAVENOUS at 19:14

## 2020-01-01 RX ADMIN — CHLORHEXIDINE GLUCONATE 15 ML: 1.2 RINSE ORAL at 22:17

## 2020-01-01 RX ADMIN — SODIUM CHLORIDE, POTASSIUM CHLORIDE, SODIUM LACTATE AND CALCIUM CHLORIDE: 600; 310; 30; 20 INJECTION, SOLUTION INTRAVENOUS at 00:31

## 2020-01-01 RX ADMIN — INSULIN LISPRO 6 UNITS: 100 INJECTION, SOLUTION INTRAVENOUS; SUBCUTANEOUS at 08:15

## 2020-01-01 RX ADMIN — LEVETIRACETAM 500 MG: 5 INJECTION INTRAVENOUS at 08:35

## 2020-01-01 RX ADMIN — LEVETIRACETAM 500 MG: 5 INJECTION INTRAVENOUS at 10:22

## 2020-01-01 RX ADMIN — INSULIN LISPRO 4 UNITS: 100 INJECTION, SOLUTION INTRAVENOUS; SUBCUTANEOUS at 01:22

## 2020-01-01 RX ADMIN — INSULIN LISPRO 3 UNITS: 100 INJECTION, SOLUTION INTRAVENOUS; SUBCUTANEOUS at 05:45

## 2020-01-01 RX ADMIN — MUPIROCIN: 20 OINTMENT TOPICAL at 08:03

## 2020-01-01 RX ADMIN — CHLORHEXIDINE GLUCONATE 15 ML: 1.2 RINSE ORAL at 08:05

## 2020-01-01 RX ADMIN — SODIUM CHLORIDE, POTASSIUM CHLORIDE, SODIUM LACTATE AND CALCIUM CHLORIDE: 600; 310; 30; 20 INJECTION, SOLUTION INTRAVENOUS at 11:15

## 2020-01-01 RX ADMIN — MUPIROCIN: 20 OINTMENT TOPICAL at 08:58

## 2020-01-01 RX ADMIN — INSULIN LISPRO 3 UNITS: 100 INJECTION, SOLUTION INTRAVENOUS; SUBCUTANEOUS at 00:44

## 2020-01-01 RX ADMIN — INSULIN LISPRO 3 UNITS: 100 INJECTION, SOLUTION INTRAVENOUS; SUBCUTANEOUS at 00:39

## 2020-01-01 RX ADMIN — CHLORHEXIDINE GLUCONATE 15 ML: 1.2 RINSE ORAL at 09:01

## 2020-01-01 RX ADMIN — INSULIN LISPRO 2 UNITS: 100 INJECTION, SOLUTION INTRAVENOUS; SUBCUTANEOUS at 18:30

## 2020-01-01 RX ADMIN — SODIUM CHLORIDE, PRESERVATIVE FREE 10 ML: 5 INJECTION INTRAVENOUS at 19:53

## 2020-01-01 RX ADMIN — MEROPENEM 500 MG: 500 INJECTION, POWDER, FOR SOLUTION INTRAVENOUS at 00:44

## 2020-01-01 RX ADMIN — INSULIN LISPRO 6 UNITS: 100 INJECTION, SOLUTION INTRAVENOUS; SUBCUTANEOUS at 16:17

## 2020-01-01 RX ADMIN — MEROPENEM 500 MG: 500 INJECTION, POWDER, FOR SOLUTION INTRAVENOUS at 11:26

## 2020-01-01 RX ADMIN — HEPARIN SODIUM 5000 UNITS: 5000 INJECTION INTRAVENOUS; SUBCUTANEOUS at 21:31

## 2020-01-01 RX ADMIN — MEROPENEM 500 MG: 500 INJECTION, POWDER, FOR SOLUTION INTRAVENOUS at 07:59

## 2020-01-01 RX ADMIN — SODIUM CHLORIDE, PRESERVATIVE FREE 10 ML: 5 INJECTION INTRAVENOUS at 21:15

## 2020-01-01 RX ADMIN — INSULIN LISPRO 3 UNITS: 100 INJECTION, SOLUTION INTRAVENOUS; SUBCUTANEOUS at 11:43

## 2020-01-01 RX ADMIN — CALCIUM GLUCONATE 1 G: 20 INJECTION, SOLUTION INTRAVENOUS at 03:10

## 2020-01-01 RX ADMIN — HEPARIN SODIUM 5000 UNITS: 5000 INJECTION INTRAVENOUS; SUBCUTANEOUS at 22:17

## 2020-01-01 RX ADMIN — LEVETIRACETAM 500 MG: 5 INJECTION INTRAVENOUS at 22:17

## 2020-01-01 RX ADMIN — PANTOPRAZOLE SODIUM 40 MG: 40 INJECTION, POWDER, FOR SOLUTION INTRAVENOUS at 08:36

## 2020-01-01 RX ADMIN — INSULIN LISPRO 2 UNITS: 100 INJECTION, SOLUTION INTRAVENOUS; SUBCUTANEOUS at 20:30

## 2020-01-01 RX ADMIN — Medication 20 MEQ: at 14:52

## 2020-01-01 RX ADMIN — HEPARIN SODIUM 5000 UNITS: 5000 INJECTION INTRAVENOUS; SUBCUTANEOUS at 14:31

## 2020-01-01 RX ADMIN — MUPIROCIN: 20 OINTMENT TOPICAL at 19:54

## 2020-01-01 RX ADMIN — MEROPENEM 500 MG: 500 INJECTION, POWDER, FOR SOLUTION INTRAVENOUS at 23:41

## 2020-01-01 RX ADMIN — SODIUM BICARBONATE: 84 INJECTION, SOLUTION INTRAVENOUS at 01:09

## 2020-01-01 RX ADMIN — HEPARIN SODIUM 5000 UNITS: 5000 INJECTION INTRAVENOUS; SUBCUTANEOUS at 05:52

## 2020-01-01 RX ADMIN — PANTOPRAZOLE SODIUM 40 MG: 40 INJECTION, POWDER, FOR SOLUTION INTRAVENOUS at 07:59

## 2020-01-01 RX ADMIN — CHLORHEXIDINE GLUCONATE 15 ML: 1.2 RINSE ORAL at 21:00

## 2020-01-01 RX ADMIN — MAGNESIUM SULFATE HEPTAHYDRATE 1 G: 1 INJECTION, SOLUTION INTRAVENOUS at 05:30

## 2020-01-01 RX ADMIN — INSULIN LISPRO 3 UNITS: 100 INJECTION, SOLUTION INTRAVENOUS; SUBCUTANEOUS at 05:52

## 2020-01-01 RX ADMIN — SODIUM CHLORIDE 2000 ML: 9 INJECTION, SOLUTION INTRAVENOUS at 01:10

## 2020-01-01 RX ADMIN — LEVETIRACETAM 500 MG: 5 INJECTION INTRAVENOUS at 20:31

## 2020-01-01 RX ADMIN — MEROPENEM 500 MG: 500 INJECTION, POWDER, FOR SOLUTION INTRAVENOUS at 11:59

## 2020-01-01 RX ADMIN — INSULIN LISPRO 6 UNITS: 100 INJECTION, SOLUTION INTRAVENOUS; SUBCUTANEOUS at 22:17

## 2020-01-01 RX ADMIN — MAGNESIUM SULFATE HEPTAHYDRATE 1 G: 1 INJECTION, SOLUTION INTRAVENOUS at 13:59

## 2020-01-01 RX ADMIN — Medication 20 MEQ: at 14:00

## 2020-01-01 RX ADMIN — Medication 2 MCG/MIN: at 10:25

## 2020-01-01 RX ADMIN — Medication 30 MCG/MIN: at 18:00

## 2020-01-01 RX ADMIN — CHLORHEXIDINE GLUCONATE 15 ML: 1.2 RINSE ORAL at 19:53

## 2020-01-01 RX ADMIN — MEROPENEM 500 MG: 500 INJECTION, POWDER, FOR SOLUTION INTRAVENOUS at 01:29

## 2020-01-01 RX ADMIN — LEVETIRACETAM 500 MG: 5 INJECTION INTRAVENOUS at 21:31

## 2020-01-01 RX ADMIN — SODIUM CHLORIDE, POTASSIUM CHLORIDE, SODIUM LACTATE AND CALCIUM CHLORIDE: 600; 310; 30; 20 INJECTION, SOLUTION INTRAVENOUS at 15:42

## 2020-01-01 RX ADMIN — MUPIROCIN: 20 OINTMENT TOPICAL at 08:35

## 2020-01-01 RX ADMIN — HEPARIN SODIUM 5000 UNITS: 5000 INJECTION INTRAVENOUS; SUBCUTANEOUS at 14:04

## 2020-01-01 RX ADMIN — HEPARIN SODIUM 5000 UNITS: 5000 INJECTION INTRAVENOUS; SUBCUTANEOUS at 20:31

## 2020-01-01 RX ADMIN — SODIUM CHLORIDE, PRESERVATIVE FREE 10 ML: 5 INJECTION INTRAVENOUS at 09:03

## 2020-01-01 RX ADMIN — Medication 20 MCG/MIN: at 01:11

## 2020-01-01 RX ADMIN — MEROPENEM 500 MG: 500 INJECTION, POWDER, FOR SOLUTION INTRAVENOUS at 16:25

## 2020-01-01 RX ADMIN — MEROPENEM 500 MG: 500 INJECTION, POWDER, FOR SOLUTION INTRAVENOUS at 02:56

## 2020-01-01 RX ADMIN — PANTOPRAZOLE SODIUM 40 MG: 40 INJECTION, POWDER, FOR SOLUTION INTRAVENOUS at 08:03

## 2020-01-01 RX ADMIN — PERFLUTREN 2 ML: 6.52 INJECTION, SUSPENSION INTRAVENOUS at 14:53

## 2020-01-01 RX ADMIN — SODIUM CHLORIDE, PRESERVATIVE FREE 10 ML: 5 INJECTION INTRAVENOUS at 08:03

## 2020-01-01 RX ADMIN — SODIUM CHLORIDE 11.4 UNITS/HR: 9 INJECTION, SOLUTION INTRAVENOUS at 04:30

## 2020-01-01 RX ADMIN — MAGNESIUM SULFATE HEPTAHYDRATE 1 G: 1 INJECTION, SOLUTION INTRAVENOUS at 07:07

## 2020-01-01 RX ADMIN — CALCIUM GLUCONATE 1 G: 98 INJECTION, SOLUTION INTRAVENOUS at 11:27

## 2020-01-01 RX ADMIN — SODIUM BICARBONATE: 84 INJECTION, SOLUTION INTRAVENOUS at 23:46

## 2020-01-01 RX ADMIN — MUPIROCIN: 20 OINTMENT TOPICAL at 08:00

## 2020-01-01 ASSESSMENT — PULMONARY FUNCTION TESTS
PIF_VALUE: 26
PIF_VALUE: 26
PIF_VALUE: 35
PIF_VALUE: 30
PIF_VALUE: 29
PIF_VALUE: 25
PIF_VALUE: 29
PIF_VALUE: 21
PIF_VALUE: 31
PIF_VALUE: 22
PIF_VALUE: 29
PIF_VALUE: 31
PIF_VALUE: 34
PIF_VALUE: 29
PIF_VALUE: 32
PIF_VALUE: 26
PIF_VALUE: 30
PIF_VALUE: 25
PIF_VALUE: 24
PIF_VALUE: 18
PIF_VALUE: 25
PIF_VALUE: 37
PIF_VALUE: 27
PIF_VALUE: 27
PIF_VALUE: 28
PIF_VALUE: 26
PIF_VALUE: 15
PIF_VALUE: 26
PIF_VALUE: 20
PIF_VALUE: 23
PIF_VALUE: 28
PIF_VALUE: 28
PIF_VALUE: 24
PIF_VALUE: 26
PIF_VALUE: 30
PIF_VALUE: 23
PIF_VALUE: 17
PIF_VALUE: 22
PIF_VALUE: 9
PIF_VALUE: 17
PIF_VALUE: 29
PIF_VALUE: 20
PIF_VALUE: 13
PIF_VALUE: 25
PIF_VALUE: 25
PIF_VALUE: 28
PIF_VALUE: 29
PIF_VALUE: 15
PIF_VALUE: 16
PIF_VALUE: 19
PIF_VALUE: 30
PIF_VALUE: 29
PIF_VALUE: 26
PIF_VALUE: 20
PIF_VALUE: 23
PIF_VALUE: 24
PIF_VALUE: 19
PIF_VALUE: 27
PIF_VALUE: 26
PIF_VALUE: 29
PIF_VALUE: 24
PIF_VALUE: 21
PIF_VALUE: 8
PIF_VALUE: 28
PIF_VALUE: 24
PIF_VALUE: 27
PIF_VALUE: 26
PIF_VALUE: 25
PIF_VALUE: 26
PIF_VALUE: 28
PIF_VALUE: 8
PIF_VALUE: 26
PIF_VALUE: 12
PIF_VALUE: 24
PIF_VALUE: 23
PIF_VALUE: 20
PIF_VALUE: 27
PIF_VALUE: 27
PIF_VALUE: 25
PIF_VALUE: 23
PIF_VALUE: 12
PIF_VALUE: 25
PIF_VALUE: 14
PIF_VALUE: 26
PIF_VALUE: 28
PIF_VALUE: 33
PIF_VALUE: 24
PIF_VALUE: 23
PIF_VALUE: 24
PIF_VALUE: 26
PIF_VALUE: 16
PIF_VALUE: 26
PIF_VALUE: 17
PIF_VALUE: 32
PIF_VALUE: 18
PIF_VALUE: 26
PIF_VALUE: 28
PIF_VALUE: 18
PIF_VALUE: 33
PIF_VALUE: 24
PIF_VALUE: 25
PIF_VALUE: 36
PIF_VALUE: 28
PIF_VALUE: 25
PIF_VALUE: 26
PIF_VALUE: 9
PIF_VALUE: 30
PIF_VALUE: 32
PIF_VALUE: 14
PIF_VALUE: 23
PIF_VALUE: 31
PIF_VALUE: 19
PIF_VALUE: 30
PIF_VALUE: 23
PIF_VALUE: 25
PIF_VALUE: 23
PIF_VALUE: 20
PIF_VALUE: 20
PIF_VALUE: 18
PIF_VALUE: 16
PIF_VALUE: 27
PIF_VALUE: 20
PIF_VALUE: 22
PIF_VALUE: 20
PIF_VALUE: 28
PIF_VALUE: 29
PIF_VALUE: 22
PIF_VALUE: 24
PIF_VALUE: 27
PIF_VALUE: 29
PIF_VALUE: 26
PIF_VALUE: 25
PIF_VALUE: 28
PIF_VALUE: 26
PIF_VALUE: 16
PIF_VALUE: 26
PIF_VALUE: 16
PIF_VALUE: 14
PIF_VALUE: 22
PIF_VALUE: 26
PIF_VALUE: 22
PIF_VALUE: 26
PIF_VALUE: 25
PIF_VALUE: 28
PIF_VALUE: 26
PIF_VALUE: 32
PIF_VALUE: 31
PIF_VALUE: 23
PIF_VALUE: 25
PIF_VALUE: 26
PIF_VALUE: 30
PIF_VALUE: 24
PIF_VALUE: 25
PIF_VALUE: 25
PIF_VALUE: 10
PIF_VALUE: 26
PIF_VALUE: 28
PIF_VALUE: 24
PIF_VALUE: 11
PIF_VALUE: 21
PIF_VALUE: 33
PIF_VALUE: 26
PIF_VALUE: 26
PIF_VALUE: 9
PIF_VALUE: 8
PIF_VALUE: 20
PIF_VALUE: 28
PIF_VALUE: 25
PIF_VALUE: 28
PIF_VALUE: 24
PIF_VALUE: 17
PIF_VALUE: 23
PIF_VALUE: 27
PIF_VALUE: 25
PIF_VALUE: 24
PIF_VALUE: 31
PIF_VALUE: 29
PIF_VALUE: 27
PIF_VALUE: 24
PIF_VALUE: 24
PIF_VALUE: 15
PIF_VALUE: 24
PIF_VALUE: 15
PIF_VALUE: 27
PIF_VALUE: 29
PIF_VALUE: 32
PIF_VALUE: 26
PIF_VALUE: 17
PIF_VALUE: 28
PIF_VALUE: 13
PIF_VALUE: 22
PIF_VALUE: 29
PIF_VALUE: 25
PIF_VALUE: 21
PIF_VALUE: 29
PIF_VALUE: 26
PIF_VALUE: 28
PIF_VALUE: 20
PIF_VALUE: 24
PIF_VALUE: 27
PIF_VALUE: 28
PIF_VALUE: 30
PIF_VALUE: 24
PIF_VALUE: 26
PIF_VALUE: 8

## 2020-01-01 ASSESSMENT — PAIN SCALES - GENERAL
PAINLEVEL_OUTOF10: 0

## 2020-06-06 PROBLEM — I46.9 CARDIAC ARREST (HCC): Status: ACTIVE | Noted: 2020-01-01

## 2020-06-06 NOTE — H&P
Patient has no allergy information on record. Social History:  The patient currently lives at [address] [place] [address] St:   has no history on file for tobacco.  ETOH:   has no history on file for alcohol. Family History:  Reviewed in detail and negative for DM, Early CAD, Cancer, CVA. Positive as follows:    No family history on file. REVIEW OF SYSTEMS: Unable to obtain    PHYSICAL EXAM:    Pulse 56   Resp 20   SpO2 100%     General appearance: Sedated intubated  HEENT Normal cephalic, atraumatic without obvious deformity. Pinpoint pupils but equal, round, and reactive to light. Extra ocular muscles intact. Conjunctivae/corneas clear. Neck: Supple, No jugular venous distention/bruits. Trachea midline without thyromegaly or adenopathy with full range of motion. Lungs: Ventilated clear to auscultation, bilaterally without Rales/Wheezes/Rhonchi with good respiratory effort. Heart: Regular rate and rhythm with Normal S1/S2 without murmurs, rubs or gallops, point of maximum impulse non-displaced  Abdomen: Soft, non-tender or non-distended without rigidity or guarding and positive bowel sounds all four quadrants. Extremities: No clubbing, cyanosis, or edema bilaterally. Skin: Skin color, texture, turgor normal.  No rashes or lesions. Neurologic: Sedated intubated, withdraws to painful stimuli. Has some spontaneous movement although movement is myoclonic jerking in nature.   Mental status: Sedated intubated  Capillary Refill: Acceptable  < 3 seconds  Peripheral Pulses: +3 Easily felt, not easily obliterated with pressure      CBC   Recent Labs     06/06/20 1838   WBC 19.2*   HGB 7.7*   HCT 25.1*         RENAL  Recent Labs     06/06/20 1909   *   K 4.5  4.5   CL 91*   CO2 19*   PHOS 6.7*   BUN 41*   CREATININE 1.8*     LFT'S  Recent Labs     06/06/20 1909   AST 91*   ALT 56*   BILITOT 0.6   ALKPHOS 155*     COAG  Recent Labs     06/06/20 1838   INR 2.16*     CARDIAC ENZYMES  Recent Labs     06/06/20 1909   TROPONINI 0.22*       U/A:  No results found for: NITRITE, COLORU, WBCUA, RBCUA, MUCUS, BACTERIA, CLARITYU, SPECGRAV, LEUKOCYTESUR, BLOODU, GLUCOSEU, AMORPHOUS    ABG    Lab Results   Component Value Date    GCQ6QDU 19.0 06/06/2020    BEART -7.3 06/06/2020    I1GYAGXG 99.2 06/06/2020    PHART 7.272 06/06/2020    QAV7SSU 41.2 06/06/2020    PO2ART 123.0 06/06/2020    CZD3DQC 20.2 06/06/2020           Active Hospital Problems    Diagnosis Date Noted    Cardiac arrest [facility]) [I46.9] 06/06/2020         PHYSICIANS CERTIFICATION:    I certify that Kota Keys is expected to be hospitalized for more than 2 midnights based on the following assessment and plan:      ASSESSMENT/PLAN:    Cardiac arrest:  -Cause uncertain. Troponin and procalcitonin on arrival at [facility] ED were within normal limits. WBC was elevated at 14 in potassium elevated at 5.9.  -Empiric antibiotic treatment for now with Vanco and Zosyn   -Blood cultures obtained at outside hospital.  Follow-up results  -COVID rule out test pending  -Hypothermia protocol initiated. Given myoclonic jerking patient has poor neurologic status prognosis    Potential hyperkalemia: Potassium 5.9 at outside hospital.  Will repeat lab and treat if needed    Chronic pleural catheter:  -Uncertain the cause. May be due to CHF or some type of malignancy. -CT chest pending    Chronic medical history uncertain: We will need to obtain records    DVT Prophylaxis: Lovenox  Diet: Diet NPO Effective Now  Code Status: Full Code  PT/OT Eval Status: Sedated intubated    Dispo -ICU status       Samantha Peralta MD    Thank you No primary care provider on file. for the opportunity to be involved in this patient's care. If you have any questions or concerns please feel free to contact me at [phone].

## 2020-06-07 NOTE — PROGRESS NOTES
RN called about pt desat 86%. fio2 changed to 70%. sats back up to 95%. Will continue to monitor.     Electronically signed by Binta Wu RCP on 6/7/2020 at 3:43 AM

## 2020-06-07 NOTE — CONSULTS
Clinical Pharmacy Note  Vancomycin Consult    Lexi Saul is a 78 y.o. male ordered Vancomycin for cardiac arrest/emperic therapy; consult received from Dr. Varghese Peace to manage therapy. Also receiving zosyn 3.375 gms every 8 hours. Patient Active Problem List   Diagnosis    Cardiac arrest [facility])       Allergies:  Patient has no allergy information on record. Temp max:  Temp (24hrs), Av.8 °F (33.8 °C), Min:92.5 °F (33.6 °C), Max:93.1 °F (33.9 °C)      Recent Labs     20  1838   WBC 19.2*       Recent Labs     20  1909   BUN 41*   CREATININE 1.8*       No intake or output data in the 24 hours ending 20 2152    Culture Results:      Ht Readings from Last 1 Encounters:   20 6' 1\" (1.854 m)        Wt Readings from Last 1 Encounters:   20 211 lb 6.7 oz (95.9 kg)         Estimated Creatinine Clearance: 39 mL/min (A) (based on SCr of 1.8 mg/dL (H)). Assessment/Plan:  Vancomycin 1500 mg IVPB times one dose. Goal trough level of 15-20 mg/L. Level ordered for 0620. Thank you for the consult. Will continue to follow.     48 [facility], [facility], [place] 2020  9:54 PM

## 2020-06-07 NOTE — PROGRESS NOTES
0730 Handoff completed. Patient in maintenance phase of therapeutic hypothermia. Temp at goal. Other VSS on ventilator, insulin drip, sodium bicarb. No sedation, no pressors at this time. Patient has non-purposeful myoclonic movements. Pupils 1-2 nonreactive. Minimal gag/cough. No response to pain or voice. L chest tube draining serosanguinous drainage. Dr. Leisa Carrillo notified of critical lactic acid. 0800 Dr. Leisa Carrillo at bedside, vent settings adjusted. Continue hypothermia protocol. 0900 [facility] referral made, not candidate for organ donation. 0930 Dr. Heriberto Veras at bedside, no new orders. Will attempt to get medical records from past hospitals, as no records available from [facility]. 1100 Spoke to daughter, Smitha Alfred,     36 Dr. Marita Rae at bedside, renal function improving, continue serial labs. [address] reviewed with Dr. Marita Rae, replacements ordered. Blood sugars stabilized, insulin dc'd per Dr. Heriberto Veras. 1800 Continues through maintenance phase of hypothermia. Temp well controlled, other VSS, assessment grossly unchanged.      Electronically signed by Francisco Amezcua RN on 6/7/2020 at 6:28 PM

## 2020-06-07 NOTE — ACP (ADVANCE CARE PLANNING)
changes to patient's previously recorded wishes  [] New Advance Directive completed  [] Portable Do Not Rescitate prepared for Provider review and signature  [] POLST/POST/MOLST/MOST prepared for Provider review and signature      Follow-up plan:    [] Schedule follow-up conversation to continue planning  [] Referred individual to Provider for additional questions/concerns   [] Advised patient/agent/surrogate to review completed ACP document and update if needed with changes in condition, patient preferences or care setting    [] This note routed to one or more involved healthcare providers - unable to route to his PCP      Electronically signed by Lelo Malone RN on 6/7/2020 at 11:25 AM

## 2020-06-07 NOTE — PROGRESS NOTES
1930: Report received from Pasquale Arnold [facility]  2005: Pt to CT  2035: Pt returned from CT  2105: Call made to pt's wife for updates on pt and to obtain consent for procedures. 2230: Dr. Higinio Berry at bedside to place June, central line, and chest tube. 0030: Hypothermia protocol initiated. 0109: 2L NS bolus and Sodium Bicarb infusion started per order. 0244: Message sent to Dr. Toy Ruiz in request for magnesium replacements. 0400: New orders received for insulin gtt and magnesium replacement  0439: Levo gtt stopped. 5092:  Insulin gtt started  0700: Report given to Gelacio Hopper RN  Electronically signed by Jes Betts RN on 6/7/2020 at 7:30AM

## 2020-06-07 NOTE — PROGRESS NOTES
Office : [phone]     Fax :[phone]         Renal Progress Note  Subjective:   Admit Date: 6/6/2020     HPI     78 yr old gentleman transferred from [facility] yesterday . He had cardiac arrest and was intubated. Was transferred to [address] [place] [address],[address].   Has h/o history of valvular disease, CHF, diabetes, carotid endarterectomy, oral cancer          Interval History:     He is on hypothermia protocol   BP is maintained   UOP is low. Not on any sedation at this time.    BS high   Lactic acid high   Off levophed now     DIET Diet NPO Effective Now  Medications:   Scheduled Meds:   sodium chloride flush  10 mL Intravenous 2 times per day    chlorhexidine  15 mL Mouth/Throat BID    vancomycin (VANCOCIN) intermittent dosing (placeholder)   Other RX Placeholder    meropenem  500 mg Intravenous Q8H    mupirocin   Topical BID    pantoprazole  40 mg Intravenous Daily     Continuous Infusions:   insulin 13.28 Units/hr (06/07/20 0642)    dextrose      norepinephrine Stopped (06/07/20 0439)    sodium bicarbonate in sterile water infusion 100 mL/hr at 06/07/20 0109       Labs:  CBC:   Recent Labs     06/06/20  1838 06/07/20  0630   WBC 19.2* 15.7*   HGB 7.7* 7.2*    284     BMP:    Recent Labs     06/06/20  1909 06/07/20  0115 06/07/20  0630   * 134* 132*   K 4.5  4.5 4.4 3.9  3.9   CL 91* 93* 92*   CO2 19* 19* 19*   BUN 41* 44* 43*   CREATININE 1.8* 1.8* 2.0*   GLUCOSE 309* 480* 377*     Ca/Mg/Phos:   Recent Labs     06/06/20  1909 06/07/20  0115 06/07/20  0630   CALCIUM 8.4 8.0* 7.6*   MG 1.70* 1.50* 1.90   PHOS 6.7* 6.1* 6.8*     Hepatic:

## 2020-06-07 NOTE — PROCEDURES
Central Venous Catheter Insertion Procedure Note    Consent: wife    Informed consent was obtained for the procedure, including sedation. Risks of  hemorrhage, arrhythmia, infection and adverse drug reaction were discussed    Indication:  Shock, Limited Access     Estimated Blood Loss: Minimal    Procedure:    Ultrasound used and leftfemoral vein was noted to be patent. Patient positioned then prepared with sterile technique including gown, mask, drape and gloves. Lidocaine was administered via 25 gauge needle. 18 gauge needle inserted with access of leftfemoral vein under active ultrasound guidance. Wire inserted to 20 cm  . Small incision made in skin, dilator inserted over wire then removed. Triple lumen inserted to 20 cm, secured and sterile dressing applied. Ultrasound Report:  Patent vein and artery with wire noted in both after completed. Complications:  None  There were no changes to vital signs. Patient did tolerate procedure well. Total time of procedure 25 minutes. [id number]  [id number]      Arterial Catheter Insertion Procedure Note  Consent: Unable to be obtained due to the emergent nature of this procedure. Procedure: Insertion of Arterial Catheter    Indications:   Hypotension, Shock    Estimated Blood Loss: Minimal    Procedure Details   Informed consent was obtained for the procedure, including sedation. Risks of  hemorrhage, arrhythmia, infection and adverse drug reaction were discussed. Ultrasound used and Left Femoral artery was noted to be patent. Under sterile conditions the skin above the Left Femoral artery was prepped with betadine and covered with a sterile drape with gown, mask, gloves . Sloan Astrid Local anesthesia was applied to the skin and subcutaneous tissues. A 22-gauge needle was inserted into the Left Femoral artery under active ultrasound guidance. A guide wire was then passed easily through the catheter which was then advanced with no resistance.  Good pulsatile blood returned. The catheter was sutured into place. Findings: There were no changes to vital signs. Patient did tolerate procedure well. Total time of procedure 15 minutes. [id number]  [id number]      Chest tube Insertion Procedure Note    Consent: wife   Informed consent was obtained for the procedure, including sedation. Risks of  hemorrhage, arrhythmia, infection and adverse drug reaction were discussed    Indication: left pleural effusion      Estimated Blood Loss: Minimal    Procedure:    Ultrasound used and left chest with noted large left effusion with plankton sign . Patient positioned then prepared with sterile technique including gown, mask, drape and gloves. Lidocaine was administered via 25 gauge needle. 18 gauge needle inserted with access of left chest, up and over rib at prior marked position with cloudy fluid  return. Wire inserted to 20 cm. Small incision made in skin, dilators inserted over wire then removed. Chest tube was inserted to 20 depth. Complications: There were no changes to vital signs. Patient did tolerate procedure well. CXR ordered. No immedicate complications. Ultrasound used to rule out pneumothorax. Total time of procedure 25 minutes.

## 2020-06-07 NOTE — PROGRESS NOTES
RN called about pt needing sputum sample sent. Sputum sample obtained and handed to the RN to sent to lab for results.      Electronically signed by Keshawn Green RCP on 6/7/2020 at 3:40 AM

## 2020-06-07 NOTE — CONSULTS
REASON FOR CONSULTATION/CC: shock      Consult at request of Benjie Malloy MD for shock    PCP: No primary care provider on file. Established Pulmonologist: unknown    HISTORY OF PRESENT ILLNESS: Cherrie Mendenhall is a 78y.o. year old male with a history of valvular disease, CHF, diabetes, carotid endarterectomy, oral cancer who presents with after cardiac arrest.  Unfortunately, the patient was a transfer from outside facility and has been treated multiple other outside facilities. None of which to be have records of. Further, outside records not very detailed on the preceding events. He said the other information that we have from nursing report, daughter, wife it appears that he was in a rehab facility. Per the wife, the patient was being helped to the toilet at which point the patient had a cardiac arrest.  Bystander CPR was started. He was transferred to the ER via EMS. From the ER documentation, it took 20 minutes for return of spontaneous circulation. Therefore, it is suspected that the patient was without pulse for approximately 40 more minutes. His prior history is very complicated and last known to the daughter and wife. It appears that he has been treated at [facility] and also was transferred to [facility] in [place]. During this admission, he had some type of a bacteremia, type unknown, and PICC was placed for prolonged antibiotics, duration unknown and antibiotic unknown. Prior to this, he had repeated right-sided pleural effusions. Thoracentesis was done. She does not know the results of this. However, with recurrence, without etiology, a Pleurx was placed. Multiple draining's was done by the daughter. Prior to the above admission, he was complaining of right shoulder pain. He had dementia but was otherwise healthy prior to this. He has history of CHF but severity is unknown. PAST MEDICAL HISTORY:  No past medical history on file.    See

## 2020-06-07 NOTE — PROGRESS NOTES
Pulmonary Progress Note    Date of Admission: 6/6/2020   LOS: 1 day       CC:  shock    Subjective:  Off levo over night  No response to pain. Not on sedation     ROS:   Unable to assess       PHYSICAL EXAM:   Blood pressure (!) 129/49, pulse 72, temperature (!) 91 °F (32.8 °C), resp. rate 21, height 6' 1\" (1.854 m), weight 209 lb 10.5 oz (95.1 kg), SpO2 93 %.'  Gen: + distress. Eyes: PERRL. No sclera icterus. No conjunctival injection. ENT: No discharge. Pharynx clear. External appearance of ears and nose normal.  Neck: Trachea midline. No obvious mass. Resp: No accessory muscle use. No crackles. No wheezes. +++ rhonchi. CV: Regular rate. Regular rhythm. No murmur or rub. No edema. GI: Non-tender. Non-distended. No hernia. Skin: Warm, dry, normal texture and turgor. No nodule on exposed extremities. Lymph: No cervical LAD. No supraclavicular LAD. M/S: No cyanosis. No clubbing. No joint deformity.     Neuro:      Psych:   Myoclonic response to pain       Medications:    Scheduled Meds:   magnesium sulfate  2 g Intravenous Once    sodium chloride flush  10 mL Intravenous 2 times per day    chlorhexidine  15 mL Mouth/Throat BID    vancomycin (VANCOCIN) intermittent dosing (placeholder)   Other RX Placeholder    meropenem  500 mg Intravenous Q8H    mupirocin   Topical BID    pantoprazole  40 mg Intravenous Daily       Continuous Infusions:   insulin 13.28 Units/hr (06/07/20 4365)    dextrose      norepinephrine Stopped (06/07/20 0439)    sodium bicarbonate in sterile water infusion 100 mL/hr at 06/07/20 0109       PRN Meds:  magnesium sulfate, glucose, dextrose, glucagon (rDNA), dextrose, sodium chloride flush, acetaminophen **OR** acetaminophen, polyethylene glycol, promethazine **OR** ondansetron, perflutren lipid microspheres    Labs reviewed:  CBC:   Recent Labs     06/06/20  1838 06/07/20  0630   WBC 19.2* 15.7*   HGB 7.7* 7.2*   HCT 25.1* 23.3*   MCV 90.3 89.3    284 Ground-glass and airspace  infiltrates throughout both lungs. Atelectasis in the right lower lung. Right pleural effusion tracking along the apex, likely loculated      Impression No evidence of left pleural effusion currently status post chest tube  placement. No pneumothorax. Bilateral ground-glass and airspace opacities with atelectasis in the right  lower lung    Probable loculated right pleural effusion           Assessment:      Left pleural effusion  Right pleural effusion  Healthcare associated pneumonia  Septic shock  Cardiac arrest  Acute kidney injury  Lactic acidosis  Dementia  History of hypertension  History of diabetes  Elevated troponin  Anemia     Plan:      Left pleural effusion  - Suction. - need monitor for infection of pleur-x      Healthcare associated pneumonia. -follow Procalcitonin    - WBC improved. - Vanc / [address].      Right pleural effusion  -concern for malignancy. - no further work up currently. - fluid culture pending     Shock  - bicarb drip. - levo off. - Lactic acid         Status post cardiac arrest  -Etiology likely septic shock  - hypothermia protocol         Acute kidney injury  - bicarb  - Cr. Slightly   - likely no HD today. - very poor prognosis. May not be HD candidate. Hyperglycemia / DM  - insulin drip      Electrolytes  - K:   - Ca: replace.   - Mg:  - Phos:     Prophylaxis  - GI -  protonix    - DVT -elevated INR. Eliquis prior to admission.  - VAP - peridex  - Nasal Decolonization - Bactroban     Nutrition  - Diet NPO Effective Now         Access  Arterial   Arterial Line 06/06/20 Left Femoral (Active)   Continued need for line? Yes 6/7/2020 12:00 AM   Site Assessment Clean;Dry; Intact 6/7/2020 12:00 AM   Line Status Arterial fluids per protocol; Intact and in place; Blood return noted 6/7/2020 12:00 AM   Art Line Waveform Appropriate;Square wave test performed; Whip 6/7/2020 12:00 AM   Art Line Interventions Zeroed and

## 2020-06-08 NOTE — PROGRESS NOTES
is over    Healthcare associated pneumonia  -treat as MRSA/ gram neg pneumonia  -CT shows bilateral consolidation with small pleural effusion  -On broad-spectrum antibiotic      Left-sided pleural effusion  -Has Pleurx catheter. Reason unclear. Possibly has some underlying malignancy    Shock-due to septic shock  -poa  -Weaned off Levophed  -Blood cultures pending  -COVID-19 ordered    Acute kidney injury  -Creatinine still 1.9    Elevated troponin  -Monitor    DVT Prophylaxis: Heparin  Diet: Diet NPO Effective Now  Code Status: Limited    Due to the immediate potential for life-threatening deterioration due to septic shock / resp failure , I spent > 35 minutes providing critical care. This time is excluding time spent performing procedures. Discharge plan - ct ICU care     The patient and / or the family were informed of the results of any tests, a time was given to answer questions, a plan was proposed and they agreed with plan. Discussed with consulting physicians, nursing and social work     The note was completed using EMR. Every effort was made to ensure accuracy; however, inadvertent computerized transcription errors may be present.        Juani Bal MD

## 2020-06-08 NOTE — PROGRESS NOTES
Nutrition Assessment    Type and Reason for Visit: Initial(vent)    Nutrition Recommendations:   Start nutrition when appropriate    Nutrition Assessment: Pt with pmh of CHF, DM & oral cancer is s/p cardiac arrest with CPR > 20 minutes. Pt is intubated on pressor support & just completed hypothermia protocol. Noted concern for COVID & anoxic brain injury. Noted 2 x chest tube placement. Noted CELESTINE r/t hemodynamic changes. Nephrology following. Pt currently npo. Malnutrition Assessment:  · Malnutrition Status: Insufficient data  Due to current CDC guidelines recommending 6-ft distancing for social isolation for COVID19 prevention, NFPE/malnutrition assessment was deferred at this time.       Nutrition Risk Level: High    Nutrient Needs:  · Estimated Daily Total Kcal: 8012-1868 (20-25 x ABW 95 kg)  · Estimated Daily Protein (g): 101-168 (1.2-2 X IBW 84 kg)  · Estimated Daily Total Fluid (ml/day): per provider    Nutrition Diagnosis:   · Problem: Inadequate oral intake  · Etiology: related to Impaired respiratory function-inability to consume food     Signs and symptoms:  as evidenced by Intubation    Objective Information:  · Nutrition-Focused Physical Findings: Noted trace edema to upper & lower extremities  · Wound Type: (chest tube sites)  · Current Nutrition Therapies:  · Oral Diet Orders: NPO   · Oral Diet intake: NPO  · Oral Nutrition Supplement (ONS) Orders: None  · ONS intake: NPO  · Anthropometric Measures:  · Ht: 6' 1\" (185.4 cm)   · Current Body Wt: 210 lb (95.3 kg)(actual 6/7)  · Admission Body Wt: 211 lb (95.7 kg)(actual 6/6)  · Ideal Body Wt: 184 lb (83.5 kg), % Ideal Body    · BMI Classification: BMI 25.0 - 29.9 Overweight    Nutrition Interventions:   Continue NPO(start nutrition when appropriate)  Continued Inpatient Monitoring    Nutrition Evaluation:   · Evaluation: Goals set   · Goals: tolerate the most appropriate form of nutrition    · Monitoring: Nutrition Progression, Skin Integrity,

## 2020-06-09 NOTE — PROGRESS NOTES
(rDNA) injection 1 mg, 1 mg, Intramuscular, PRN  dextrose 5 % solution, 100 mL/hr, Intravenous, PRN  sodium chloride flush 0.9 % injection 10 mL, 10 mL, Intravenous, 2 times per day  sodium chloride flush 0.9 % injection 10 mL, 10 mL, Intravenous, PRN  acetaminophen (TYLENOL) tablet 650 mg, 650 mg, Oral, Q6H PRN **OR** acetaminophen (TYLENOL) suppository 650 mg, 650 mg, Rectal, Q6H PRN  polyethylene glycol (GLYCOLAX) packet 17 g, 17 g, Oral, Daily PRN  promethazine (PHENERGAN) tablet 12.5 mg, 12.5 mg, Oral, Q6H PRN **OR** ondansetron (ZOFRAN) injection 4 mg, 4 mg, Intravenous, Q6H PRN  chlorhexidine (PERIDEX) 0.12 % solution 15 mL, 15 mL, Mouth/Throat, BID  norepinephrine (LEVOPHED) 16 mg in dextrose 5% 250 mL infusion, 2 mcg/min, Intravenous, Continuous  vancomycin (VANCOCIN) intermittent dosing (placeholder), , Other, RX Placeholder  mupirocin (BACTROBAN) 2 % ointment, , Topical, BID  pantoprazole (PROTONIX) injection 40 mg, 40 mg, Intravenous, Daily    Data reviewed:  Labs:  CBC:   Recent Labs     06/06/20  1838 06/07/20  0630 06/08/20  0615   WBC 19.2* 15.7* 8.7   HGB 7.7* 7.2* 8.0*   HCT 25.1* 23.3* 23.9*   MCV 90.3 89.3 85.4    284 193     BMP:   Recent Labs     06/08/20  0000 06/08/20  0615 06/08/20  1200 06/09/20  0422   * 137 135* 133*   K 4.2 4.1 4.4 3.9  3.9   CL 91* 92* 90* 89*   CO2 25 26 24 22   PHOS 4.9 5.3*  --  5.5*   BUN 48* 53* 50* 60*   CREATININE 1.8* 1.9* 2.1* 2.3*     LIVER PROFILE:   Recent Labs     06/07/20  0630 06/08/20  0615 06/09/20  0422   AST 95* 90* 75*   ALT 57* 48* 39   BILITOT 0.5 0.8 0.7   ALKPHOS 142* 112 109     PT/INR:   Recent Labs     06/06/20  1838 06/08/20  0615   PROTIME 25.2* 21.1*   INR 2.16* 1.81*     APTT:   Recent Labs     06/06/20 1838   APTT 41.2*       Cultures:   Sputum culture (6/7): 2+WBCs; 1+GPC  Nasal MRSA probe: pending      Films:  Chest images and reports were reviewed by me. My interpretation is:  CXR (6/9/20):  Bilateral infiltrates

## 2020-06-09 NOTE — PROGRESS NOTES
Progress Note  Admit Date: 6/6/2020      PCP: BURTON Hinton CNP     CC: F/U for cardia arrest    Days in hospital:  3    SUBJECTIVE / Interval History:  Patient is intubated , no neurologic response    finished rewarming      Allergies  Adhesive tape    Medications    Scheduled Meds:   calcium gluconate IVPB  2 g Intravenous Once    insulin lispro  0-18 Units Subcutaneous Q4H    meropenem  500 mg Intravenous Q12H    heparin (porcine)  5,000 Units Subcutaneous 3 times per day    levetiracetam  500 mg Intravenous Q12H    sodium chloride flush  10 mL Intravenous 2 times per day    chlorhexidine  15 mL Mouth/Throat BID    vancomycin (VANCOCIN) intermittent dosing (placeholder)   Other RX Placeholder    mupirocin   Topical BID    pantoprazole  40 mg Intravenous Daily     Continuous Infusions:   lactated ringers 75 mL/hr at 06/09/20 0031    dextrose      norepinephrine 8 mcg/min (06/09/20 0817)       PRN Meds:  glucose, dextrose, glucagon (rDNA), dextrose, sodium chloride flush, acetaminophen **OR** acetaminophen, polyethylene glycol, promethazine **OR** ondansetron    Vitals    BP (!) 120/45   Pulse 86   Temp 97.9 °F (36.6 °C) (Bladder)   Resp 24   Ht 6' 1\" (1.854 m)   Wt 222 lb 3.6 oz (100.8 kg)   SpO2 96%   BMI 29.32 kg/m²     Exam:    Gen: No distress. Eyes: PERRL. No sclera icterus. No conjunctival injection. ENT: No discharge. Pharynx clear. External appearance of ears and nose normal.  Neck: Trachea midline. No obvious mass. Resp: No accessory muscle use. No crackles. No wheezes. No rhonchi. Pleurx+  CV: Regular rate. Regular rhythm. No murmur or rub. No edema. GI: Non-tender. Non-distended. No hernia. Skin: Warm, dry, normal texture and turgor. No nodule on exposed extremities. Lymph: No cervical LAD. No supraclavicular LAD. M/S: No cyanosis. No clubbing. No joint deformity.     Neuro: Exam is limited due to intubation/ BIPAP/ sedation   Psych: Exam is limited due to intubation/ BIPAP/ sedation     Data    LABS  CBC:   Recent Labs     06/06/20  1838 06/07/20  0630 06/08/20  0615   WBC 19.2* 15.7* 8.7   HGB 7.7* 7.2* 8.0*   HCT 25.1* 23.3* 23.9*   MCV 90.3 89.3 85.4    284 193     BMP:   Recent Labs     06/08/20  0000 06/08/20  0615 06/08/20  1200 06/09/20  0422   * 137 135* 133*   K 4.2 4.1 4.4 3.9  3.9   CL 91* 92* 90* 89*   CO2 25 26 24 22   PHOS 4.9 5.3*  --  5.5*   BUN 48* 53* 50* 60*   CREATININE 1.8* 1.9* 2.1* 2.3*   GLUCOSE 141* 168* 182* 207*     POC GLUCOSE:    Recent Labs     06/08/20  1742 06/08/20  1959 06/09/20  0119 06/09/20  0415 06/09/20  0802   POCGLU 166* 193* 200* 213* 215*     LIVER PROFILE:   Recent Labs     06/07/20  0630 06/08/20  0615 06/09/20  0422   AST 95* 90* 75*   ALT 57* 48* 39   LABALBU 2.4* 2.3* 2.3*   BILITOT 0.5 0.8 0.7   ALKPHOS 142* 112 109     PT/INR:   Recent Labs     06/06/20  1838 06/08/20  0615   PROTIME 25.2* 21.1*   INR 2.16* 1.81*     APTT:   Recent Labs     06/06/20 1838   APTT 41.2*     UA:  Recent Labs     06/07/20  0000   COLORU YELLOW   PHUR 5.5   WBCUA 93*   RBCUA 11*   CLARITYU CLOUDY*   SPECGRAV 1.013   LEUKOCYTESUR Negative   UROBILINOGEN 0.2   BILIRUBINUR Negative   BLOODU MODERATE*   GLUCOSEU 500*   KETUA Negative     Microbiology:  Wound Culture: No results for input(s): WNDABS, ORG in the last 72 hours. Invalid input(s):  LABGRAM  Nasal Culture: No results for input(s): ORG, MRSAPCR in the last 72 hours. Blood Culture: No results for input(s): BC, BLOODCULT2 in the last 72 hours. Fungal Culture:   No results for input(s): FUNGSM in the last 72 hours. No results for input(s): FUNCXBLD in the last 72 hours. CSF Culture:  No results for input(s): COLORCSF, APPEARCSF, CFTUBE, CLOTCSF, WBCCSF, RBCCSF, NEUTCSF, NUMCELLSCSF, LYMPHSCSF, MONOCSF, GLUCCSF, VOLCSF in the last 72 hours.   Respiratory Culture:  Recent Labs     06/07/20  0400   CULTRESP Further report to follow   LABGRAM 2+ WBC's

## 2020-06-10 NOTE — PROGRESS NOTES
0006: Report received from ARPAN Arboleda.     0800: Assessment completed. Patient with non- purposeful movement/ posturing noted. Negative corneal reflex with sluggish pupillary response. EEG results noted. Will notify MD Shawna Ayala. 0840: MD Jennifer Matos at bedside for critical care. See new orders. 1130: Assessment completed. See charting. Cathflo instilled in right PICC line. Will attempt to open PICC to get out Femoral line. No changes noted from previous assessment. 1146: Spoke to patients Esme reynolds, and updated on patient. Esme Verde would like to speak to the critical care MD and possibly proceed with withdrawal of care. Phone number given to MD Jennifer Matos. 1215: MD Jennifer Matos called this RN after speaking with patients Esme reynolds. The plan is to try to get ahold of one of patients children who lives in [place] and withdrawal care today. Esme Verde, patients daughter, will call this RN back and let us know what the plan is.     1300: Cathflo pulled out of Right arm PICC- blood return noted. Will notify critical care for triple lumen CVC removal order. 1500: Spoke to patients Esme reynolds, again and she states that they plan to withdrawal care tomorrow after patients son arrives from [place]. 1600: Order to remove Left femoral TLC. 1610: Left femoral TLC removed. 1900: Report given to ARPAN Arboleda.      Electronically signed by Diane Beyer RN on 6/10/2020 at 6:52 PM

## 2020-06-10 NOTE — PROGRESS NOTES
193     BMP:   Recent Labs     06/08/20  0000  06/08/20  0615 06/08/20  1200 06/09/20  0422 06/10/20  0620   *  --  137 135* 133* 138   K 4.2   < > 4.1 4.4 3.9  3.9 3.7   CL 91*  --  92* 90* 89* 95*   CO2 25  --  26 24 22 28   PHOS 4.9  --  5.3*  --  5.5*  --    BUN 48*  --  53* 50* 60* 50*   CREATININE 1.8*  --  1.9* 2.1* 2.3* 2.1*   GLUCOSE 141*  --  168* 182* 207* 171*    < > = values in this interval not displayed. POC GLUCOSE:    Recent Labs     06/09/20  1357 06/09/20  1611 06/09/20  1940 06/09/20  2339 06/10/20  0356   POCGLU 219* 212* 200* 193* 172*     LIVER PROFILE:   Recent Labs     06/08/20  0615 06/09/20  0422 06/10/20  0620   AST 90* 75* 68*   ALT 48* 39 29   LABALBU 2.3* 2.3* 2.2*   BILITOT 0.8 0.7 0.8   ALKPHOS 112 109 111     PT/INR:   Recent Labs     06/08/20 0615   PROTIME 21.1*   INR 1.81*     APTT:   No results for input(s): APTT in the last 72 hours. UA:  No results for input(s): NITRITE, COLORU, PHUR, LABCAST, WBCUA, RBCUA, MUCUS, TRICHOMONAS, YEAST, BACTERIA, CLARITYU, SPECGRAV, LEUKOCYTESUR, UROBILINOGEN, BILIRUBINUR, BLOODU, GLUCOSEU, KETUA, AMORPHOUS in the last 72 hours. Microbiology:  Wound Culture: No results for input(s): WNDABS, ORG in the last 72 hours. Invalid input(s):  LABGRAM  Nasal Culture: No results for input(s): ORG, MRSAPCR in the last 72 hours. Blood Culture: No results for input(s): BC, BLOODCULT2 in the last 72 hours. Fungal Culture:   No results for input(s): FUNGSM in the last 72 hours. No results for input(s): FUNCXBLD in the last 72 hours. CSF Culture:  No results for input(s): COLORCSF, APPEARCSF, CFTUBE, CLOTCSF, WBCCSF, RBCCSF, NEUTCSF, NUMCELLSCSF, LYMPHSCSF, MONOCSF, GLUCCSF, VOLCSF in the last 72 hours. Respiratory Culture:  No results for input(s): Ivonnebernardo Navarro in the last 72 hours. AFB:No results for input(s): AFBSMEAR in the last 72 hours. Urine Culture  No results for input(s): LABURIN in the last 72 hours.     RADIOLOGY:    XR

## 2020-06-10 NOTE — PROCEDURES
be seen in multiple settings including toxic, metabolic, or  degenerative conditions as well as with medication effect. An  incidental note is made of an irregular cardiac rhythm. Please  clinically correlate for possible atrial fibrillation or other cardiac  dysrhythmia. No definite epileptiform activity was present during this  recording. If seizures remain a clinical concern, then a repeat EEG may  be of further benefit. Clinical correlation is advised.         Sirisha Wood DO    D: 06/09/2020 17:31:46       T: 06/09/2020 20:45:51     /[email or web address]  Job#: [id number]     Doc#: [id number]    CC:

## 2020-06-10 NOTE — PROGRESS NOTES
Pulmonary Progress Note    CC: Cardiac arrest  Anoxic encephalopathy  Shock  Acute kidney injury  Pleural effusions  Multifocal pneumonia  Rib fractures  Hyperglycemia       24 hr events:  Weaned off levophed overnight. He remains without meaningful neurologic response. PHYSICAL EXAM:  Blood pressure (!) 147/62, pulse 115, temperature 98.7 °F (37.1 °C), temperature source Axillary, resp. rate (!) 39, height 6' 1\" (1.854 m), weight 232 lb 2.3 oz (105.3 kg), SpO2 95 %. on VC+ 18/400/35%/5    Intake/Output Summary (Last 24 hours) at 6/10/2020 0748  Last data filed at 6/10/2020 0600  Gross per 24 hour   Intake 1990.66 ml   Output 1825 ml   Net 165.66 ml       Gen: Well developed; well nourished  Eyes: No scleral icterus. No conjunctival injection. No pupillary response   ENT:  Oropharynx with ETT. External appearance of ears and nose normal.  Neck: Trachea midline. No obvious mass. No visible thyroid enlargement    Respiratory: Rhonchi bilaterally, no accessory muscle use  Cardiovascular: Irregular, no appreciable murmurs. BUE edema   Skin: Warm and dry. No rashes or ulcers on visible areas. Normal texture and turgor  Musculoskeletal: No cyanosis, clubbing or joint deformity.     Psychiatric: Intubated; decerebrate posturing to pain in the upper extremities; no response to pain in the lower extremities; no cough or gag reflex     Medications:  Current Facility-Administered Medications: insulin lispro (HUMALOG) injection vial 0-18 Units, 0-18 Units, Subcutaneous, Q4H  meropenem (MERREM) 500 mg in sterile water 10 mL IV syringe, 500 mg, Intravenous, Q12H  heparin (porcine) injection 5,000 Units, 5,000 Units, Subcutaneous, 3 times per day  levetiracetam (KEPPRA) 500 mg/100 mL IVPB, 500 mg, Intravenous, Q12H  lactated ringers infusion, , Intravenous, Continuous  glucose (GLUTOSE) 40 % oral gel 15 g, 15 g, Oral, PRN  dextrose 50 % IV solution, 12.5 g, Intravenous, PRN  glucagon (rDNA) injection 1 mg, 1 mg, Intramuscular, PRN  dextrose 5 % solution, 100 mL/hr, Intravenous, PRN  sodium chloride flush 0.9 % injection 10 mL, 10 mL, Intravenous, 2 times per day  sodium chloride flush 0.9 % injection 10 mL, 10 mL, Intravenous, PRN  acetaminophen (TYLENOL) tablet 650 mg, 650 mg, Oral, Q6H PRN **OR** acetaminophen (TYLENOL) suppository 650 mg, 650 mg, Rectal, Q6H PRN  polyethylene glycol (GLYCOLAX) packet 17 g, 17 g, Oral, Daily PRN  promethazine (PHENERGAN) tablet 12.5 mg, 12.5 mg, Oral, Q6H PRN **OR** ondansetron (ZOFRAN) injection 4 mg, 4 mg, Intravenous, Q6H PRN  chlorhexidine (PERIDEX) 0.12 % solution 15 mL, 15 mL, Mouth/Throat, BID  norepinephrine (LEVOPHED) 16 mg in dextrose 5% 250 mL infusion, 2 mcg/min, Intravenous, Continuous  mupirocin (BACTROBAN) 2 % ointment, , Topical, BID  pantoprazole (PROTONIX) injection 40 mg, 40 mg, Intravenous, Daily    Data reviewed:  Labs:  CBC:   Recent Labs     06/08/20 0615   WBC 8.7   HGB 8.0*   HCT 23.9*   MCV 85.4        BMP:   Recent Labs     06/08/20  0000 06/08/20  0615 06/08/20  1200 06/09/20  0422   * 137 135* 133*   K 4.2 4.1 4.4 3.9  3.9   CL 91* 92* 90* 89*   CO2 25 26 24 22   PHOS 4.9 5.3*  --  5.5*   BUN 48* 53* 50* 60*   CREATININE 1.8* 1.9* 2.1* 2.3*     LIVER PROFILE:   Recent Labs     06/08/20  0615 06/09/20  0422   AST 90* 75*   ALT 48* 39   BILITOT 0.8 0.7   ALKPHOS 112 109     PT/INR:   Recent Labs     06/08/20 0615   PROTIME 21.1*   INR 1.81*     APTT:   No results for input(s): APTT in the last 72 hours. Cultures:   Sputum culture (6/7): Normal fuentes  Nasal MRSA probe: Negative      Films:  Chest images and reports were reviewed by me. My interpretation is:  CXR (6/9/20):  Bilateral infiltrates (decreased on the right, increased on the left); stable small right pleural effusion; ETT and bilateral chest tubes in place      Assessment:     Cardiac arrest  Anoxic encephalopathy  Shock  Acute kidney injury  Pleural effusions  Multifocal pneumonia  Rib

## 2020-06-10 NOTE — PROGRESS NOTES
Office: [phone]       Fax: [phone]      Nephrology Progress Note        Patient's Name: Isa Aleman Date: 6/6/2020  Date of Visit: 6/10/2020    Reason for Consult:  CELESTINE      Subjective:      Mckinley Layne is a 78 y.o. male  who was transferred from [facility] on 6/6/2020 . He had cardiac arrest and was intubated. Has h/o history of valvular disease, CHF, diabetes, carotid endarterectomy, oral cancer      INTERVAL HISTORY    On vent  UOP: Fair  Creat: trending up  No purposeful movements    Off Levo    Medications:    Allergies:  Adhesive tape    Scheduled Meds:   calcium gluconate IVPB  1 g Intravenous Once    meropenem  500 mg Intravenous Q8H    alteplase  1 mg Intracatheter Once    insulin lispro  0-18 Units Subcutaneous Q4H    heparin (porcine)  5,000 Units Subcutaneous 3 times per day    levetiracetam  500 mg Intravenous Q12H    sodium chloride flush  10 mL Intravenous 2 times per day    chlorhexidine  15 mL Mouth/Throat BID    mupirocin   Topical BID    pantoprazole  40 mg Intravenous Daily     Continuous Infusions:   lactated ringers 75 mL/hr at 06/10/20 0159    dextrose         Labs:  CBC:   Recent Labs     06/08/20  0615   WBC 8.7   HGB 8.0*        Ca/Mg/Phos:   Recent Labs     06/08/20  0000 06/08/20  0615 06/08/20  1200 06/09/20  0422 06/10/20  0620   CALCIUM 8.0* 8.2* 8.2* 8.3 8.4   MG 1.90 2.00 2.00 2.10 2.00   PHOS 4.9 5.3*  --  5.5*  --          Objective:     Vitals: BP (!) 147/62   Pulse 119   Temp 98.1 °F (36.7 °C) (Axillary)   Resp (!) 32   Ht 6' 1\" (1.854 m)   Wt 232 lb 2.3 oz (105.3 kg)   SpO2 96%   BMI 30.63 kg/m²    Wt Readings from Last 3 Encounters:   06/10/20 232 lb 2.3 oz (105.3 kg)      24HR INTAKE/OUTPUT:      Intake/Output Summary (Last 24 hours) at 6/10/2020 9674  Last data filed at

## 2020-06-10 NOTE — PROGRESS NOTES
4 Eyes Skin Assessment     The patient is being assess for  Shift Handoff    I agree that 2 RN's have performed a thorough Head to Toe Skin Assessment on the patient. ALL assessment sites listed below have been assessed. Areas assessed by both nurses: Mendy Roper and Lidia rn  [x]   Head, Face, and Ears   [x]   Shoulders, Back, and Chest  [x]   Arms, Elbows, and Hands   [x]   Coccyx, Sacrum, and IschIum  [x]   Legs, Feet, and Heels        Does the Patient have Skin Breakdown?   No         Moi Prevention initiated:  Yes   Wound Care Orders initiated:  No      WOC nurse consulted for Pressure Injury (Stage 3,4, Unstageable, DTI, NWPT, and Complex wounds), New and Established Ostomies:  NA      Nurse 1 eSignature: Electronically signed by Mima Ivey RN on 6/10/20 at 7:32 AM EDT    **SHARE this note so that the co-signing nurse is able to place an eSignature**    Nurse 2 eSignature: Electronically signed by Mitchell Luna RN on 6/10/20 at 6:26 PM EDT

## 2020-06-11 NOTE — PROGRESS NOTES
Late entries:    1900: Handoff/report completed with Lidia day shift RN. Patient in bed, intubated. VSS. 2045: Shift assessment completed, see flow sheets. Patient intubated, VSS. Patient extends to painful stimuli on BUE and pupils sluggishly reactive. Generalized edema noted to all extremities. Scattered bruising noted. Radial and pedal pulses palpable. A.fib on the monitor. Rhonchi/ diminished breath sounds noted on ascultation. Patient has left femoral a. Line, brisk blood return noted, zeroed/leveled, square wave test performed. Right upper arm PICC flushed with brisk blood return. Apodaca catheter intact and in place. Oral care performed. Right chest tube in tact with tegaderm as a dressing, old drainage noted. -20 suction, 70 mL output. Patient repositioned. 2243: Patient repositioned. 0030: Reassessment completed, see flow sheets. No major changes at this time. Oral care performed per RT. Patient repositioned. 0240: Patient bathed using CHG wipes. Apodaca care performed, hair washed. Patient repositioned. 0400: Reassessment completed, see flow sheets. No major changes noted. Patient repositioned. Oral care per RT.     9131: Patient repositioned. Labs drawn and sent to lab.     3091: Call from patients daughter Lisa Hopper,  to be at hospital in around an hour for plan to withdraw care. 0715: Handoff/report completed with rossy Lazar shift RN. Patient in bed, repositioned. VSS.        Electronically signed by Vincent Andrew RN on 6/11/2020 at 7:29 AM

## 2020-06-11 NOTE — PLAN OF CARE
Nutrition Problem: Inadequate oral intake  Intervention: Food and/or Nutrient Delivery: Continue NPO(start nutrition when appropriate)  Nutritional Goals: tolerate the most appropriate form of nutrition
Problem: Falls - Risk of:  Goal: Will remain free from falls  Description: Will remain free from falls  Outcome: Met This Shift  Goal: Absence of physical injury  Description: Absence of physical injury  Outcome: Met This Shift     Problem: Restraint Use - Nonviolent/Non-Self-Destructive Behavior:  Goal: Absence of restraint indications  Description: Absence of restraint indications  Outcome: Met This Shift  Note: Patient not in bilateral soft wrist restraints. Problem: Aspiration:  Goal: Absence of aspiration  Description: Absence of aspiration  Outcome: Ongoing     Problem: Gas Exchange - Impaired:  Goal: Levels of oxygenation will improve  Description: Levels of oxygenation will improve  Outcome: Ongoing  Note: Pt on vent. VAP preventions in place. HOB always > 30 degrees except at times of repositioning. Turn Q2H. Oral care Q4H and PRN. BID Peridex Chlorhexidine ordered and used. Suction ETT Qshift and PRN. RT to assess pt daily for possible wean from vent. Daily bath with Chlorhexidine wipes for infection prevention. Routinely bathed by night shift. Sedation off. DVT prophylactic treatment is SCDs. Problem: Skin Integrity - Impaired:  Goal: Absence of new skin breakdown  Description: Absence of new skin breakdown  Outcome: Ongoing  Note:  Barrier wipes for chapo care Qshift. Dry flow pad in use on bed. Monitoring patient skin integrity for skin breakdown, turning and repositioning q2h per protocol.         Problem: Nutrition  Goal: Optimal nutrition therapy  Outcome: Ongoing  Note: NPO     Problem: Airway Clearance - Ineffective:  Goal: Ability to maintain a clear airway will improve  Description: Ability to maintain a clear airway will improve  Outcome: Not Met This Shift     Problem: Mental Status - Impaired:  Goal: Mental status will be restored to baseline  Description: Mental status will be restored to baseline  Outcome: Not Met This Shift
unresponsive with non purposeful movement. Pupils small and nonreactive. Problem: Skin Integrity - Impaired:  Goal: Will show no infection signs and symptoms  Description: Will show no infection signs and symptoms  6/8/2020 1001 by Catia Perry RN  Outcome: Ongoing  6/7/2020 2139 by Ana Tavarez RN  Outcome: Ongoing  Note: Pt turned and repositioned Q2H and PRN. Pt's arms and head supported with pillows, SCDs and heel protectors in place.    Goal: Absence of new skin breakdown  Description: Absence of new skin breakdown  Outcome: Ongoing

## 2020-06-11 NOTE — PROGRESS NOTES
Progress Note  Admit Date: 6/6/2020      PCP: Jyoti Patient, APRN - CNP     CC: F/U for cardia arrest    Days in hospital:  5    SUBJECTIVE / Interval History:  No purposeful movement. Off sedation  Plan to withdraw care today  Allergies  Adhesive tape    Medications    Scheduled Meds:   meropenem  500 mg Intravenous Q8H    insulin lispro  0-18 Units Subcutaneous Q4H    heparin (porcine)  5,000 Units Subcutaneous 3 times per day    levetiracetam  500 mg Intravenous Q12H    sodium chloride flush  10 mL Intravenous 2 times per day    chlorhexidine  15 mL Mouth/Throat BID    mupirocin   Topical BID    pantoprazole  40 mg Intravenous Daily     Continuous Infusions:   lactated ringers 75 mL/hr at 06/11/20 0522    dextrose         PRN Meds:  morphine **OR** morphine, LORazepam, glucose, dextrose, glucagon (rDNA), dextrose, sodium chloride flush, acetaminophen **OR** acetaminophen, polyethylene glycol, promethazine **OR** ondansetron    Vitals    /75   Pulse 119   Temp 98.8 °F (37.1 °C) (Oral)   Resp 22   Ht 6' 1\" (1.854 m)   Wt 224 lb 3.3 oz (101.7 kg)   SpO2 96%   BMI 29.58 kg/m²     Exam:    Gen: No distress. Eyes: PERRL. No sclera icterus. No conjunctival injection. ENT: No discharge. Pharynx clear. External appearance of ears and nose normal.  Neck: Trachea midline. No obvious mass. Resp: No accessory muscle use. No crackles. No wheezes. No rhonchi. Pleurx+  CV: Regular rate. Regular rhythm. No murmur or rub. No edema. GI: Non-tender. Non-distended. No hernia. Skin: Warm, dry, normal texture and turgor. No nodule on exposed extremities. Lymph: No cervical LAD. No supraclavicular LAD. M/S: No cyanosis. No clubbing. No joint deformity. Neuro: No purposeful movement. No gag or corneal reflex  Psych: Unable to assess due to mentation    Data    LABS  CBC:   No results for input(s): WBC, HGB, HCT, MCV, PLT in the last 72 hours.   BMP:   Recent Labs     06/09/20  5951 06/10/20  0620 06/11/20  0500   * 138 143   K 3.9  3.9 3.7  3.7 2.5*   CL 89* 95* 115*   CO2 22 28 20*   PHOS 5.5*  --   --    BUN 60* 50* 31*   CREATININE 2.3* 2.1* 1.2   GLUCOSE 207* 171* 100*     POC GLUCOSE:    Recent Labs     06/10/20  1130 06/10/20  1531 06/10/20  2045 06/11/20  0036 06/11/20  0537   POCGLU 177* 173* 162* 157* 152*     LIVER PROFILE:   Recent Labs     06/09/20  0422 06/10/20  0620 06/11/20  0500   AST 75* 68* 44*   ALT 39 29 15   LABALBU 2.3* 2.2* 1.4*   BILITOT 0.7 0.8 0.6   ALKPHOS 109 111 66     PT/INR:   No results for input(s): PROTIME, INR in the last 72 hours. APTT:   No results for input(s): APTT in the last 72 hours. UA:  No results for input(s): NITRITE, COLORU, PHUR, LABCAST, WBCUA, RBCUA, MUCUS, TRICHOMONAS, YEAST, BACTERIA, CLARITYU, SPECGRAV, LEUKOCYTESUR, UROBILINOGEN, BILIRUBINUR, BLOODU, GLUCOSEU, KETUA, AMORPHOUS in the last 72 hours. Microbiology:  Wound Culture: No results for input(s): WNDABS, ORG in the last 72 hours. Invalid input(s):  LABGRAM  Nasal Culture: No results for input(s): ORG, MRSAPCR in the last 72 hours. Blood Culture: No results for input(s): BC, BLOODCULT2 in the last 72 hours. Fungal Culture:   No results for input(s): FUNGSM in the last 72 hours. No results for input(s): FUNCXBLD in the last 72 hours. CSF Culture:  No results for input(s): COLORCSF, APPEARCSF, CFTUBE, CLOTCSF, WBCCSF, RBCCSF, NEUTCSF, NUMCELLSCSF, LYMPHSCSF, MONOCSF, GLUCCSF, VOLCSF in the last 72 hours. Respiratory Culture:  No results for input(s): Kaycee Duenas in the last 72 hours. AFB:No results for input(s): AFBSMEAR in the last 72 hours. Urine Culture  No results for input(s): LABURIN in the last 72 hours. RADIOLOGY:    XR CHEST PORTABLE   Final Result   Persistent bilateral airspace disease, increasing and left perihilar region   as compared to prior. Persistent small right pleural effusion.          XR CHEST PORTABLE   Final Result   No evidence

## 2020-06-11 NOTE — PROGRESS NOTES
0810-switching assignments, assuming care, pt sedate on vent with distress, await family, likely terminal extubation today,   45-wife and daughters to bedside, updated by RN, allowing family time with pt, explained plan of care,   905-pt extubated br RT, premedicated for comfort, wife and daughters back to bedside,   941-pt asystole, wife at bedside,   1040-all LInes dc, unable to pull pluerex drain, cut at skin, pt not a coroners case, Michael Nina released pt,   1119-body to [facility],  [facility] RN[facility], [phone], no belongings with pt, . Electronically signed by Layton Matthews RN on 2020 at 11:21 AM

## 2021-06-01 NOTE — PROGRESS NOTES
Pt has been scheduled for appt.  HARSH Quispe   Transferred from [facility] by Dr. Serena Pond at [facility]   S/p cardiac arrest   Intubated   CELESTINE with acidosis   Recommend to dose adjust all medications  based on renal functions  Maintain SBP> 90 mmHg   Daily weights   AVOID NSAIDs  Avoid Nephrotoxins  Monitor Intake/Output  Call if any questions       Gale Arita MD  Nephrology   [facility]: [address] [place] [address], [address] [place] [id number]  Office : [phone]  Fax :[phone]

## 2023-05-31 NOTE — PROGRESS NOTES
Called and left message for patient to return call to discuss CT lung scan results, which showed:    New 13 mm branching opacity in the left lower lobe with  morphologic features favoring infectious bronchiolitis.    It is recommended to repeat imaging in 3 months and repeat CT order placed.    juliano is not touching pads, and restart therapy. This RN did this, will continue to monitor. 0103: Page to [facility], RACHEL sun help line sent regarding another error message. 0107: Call back from [address] [place] [address]  Walked through how to trouble shoot error message 113. Nurse to call back in 10 min and see if trouble shooting worked. 0117: Call from [address] [place] [address] , trouble shooting worked. 4808: Another error message on arctic sun. 4095: Message sent to on call critical care, Dr. Génesis Watts, regarding the arctic sun and its error messages throughout the night. OK to take patient off arctic sun machine since patient has been normothermic for 36 hours. 2252: Patient unhooked from [employer]. Pads remain on.     0400: Reassessment completed, see flow sheets. No major changes at this time. VSS. Patient repositioned. 0710: Handoff/report completed with Lidia day shift RN. Patient repositioned & bedside skin assessment completed, see other note. Sacral preventative mepilex boarder applied.      Electronically signed by Dada Espinoza RN on 6/10/2020 at 7:32 AM